# Patient Record
Sex: FEMALE | Race: WHITE | NOT HISPANIC OR LATINO | Employment: UNEMPLOYED | URBAN - METROPOLITAN AREA
[De-identification: names, ages, dates, MRNs, and addresses within clinical notes are randomized per-mention and may not be internally consistent; named-entity substitution may affect disease eponyms.]

---

## 2023-12-11 NOTE — PROGRESS NOTES
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 She does not have any evidence of any tumor recurrence.      Dysphagia secondary to dryness and to malocclusion. She seems to be adapting.     Hypothyroidism which is well controlled with medication.     I will see her in 4 months.      Chief Complaint     Follow-up status post surgery for the management of an oral cavity cancer.      History of Present Illness    This lady was seen December 2021 at the request of a local colleague. She was first noticed to have a lesion in the back aspect of her buccal region on the right side back in March 2021. This led to a few procedures where some superficial lesion was removed. She has had ongoing issues and more recently biopsies were obtained and showed invasive squamous cell carcinoma. She had surgery on December 20, 2021. She did undergo a composite resection. She was presented at our tumor board and radiation therapy was recommended. She finished radiation at the end of March 2022. She has been able to swallow by mouth. Does have some significant restriction in her diet. She is hypothyroid and on thyroid medication. She had a TSH level in May 2023 which was normal. She had a PET scan done in July 2022 that I personally reviewed. I cannot appreciate any evidence of tumor recurrence.  She had a chest x-ray in August 2023 which was negative.      Physical Exam    Examination of the oral cavity and oropharynx is negative for any worrisome mucosal lesions. There is good mobility of the tongue. She does have some trismus secondary to some scarring in the buccal area. She does have some malocclusion. She also has significant dryness. Palpation of the parotid, neck, and thyroid field fails to show any worrisome masses or adenopathies.      A flexible laryngoscopy was carried out. Under topical Xylocaine and Luis M-Synephrine the scope was introduced through the nostril. The nasopharynx,  base of tongue, hypopharynx, and larynx are visualized. The vocal cords are normally mobile. There is no pooling of secretions in the piriform sinuses. There is no evidence of any mucosal lesions. She does have some inflammation around the area.

## 2023-12-12 ENCOUNTER — OFFICE VISIT (OUTPATIENT)
Dept: OTOLARYNGOLOGY | Facility: CLINIC | Age: 78
End: 2023-12-12
Payer: MEDICARE

## 2023-12-12 VITALS — WEIGHT: 179 LBS | BODY MASS INDEX: 32.94 KG/M2 | HEIGHT: 62 IN

## 2023-12-12 DIAGNOSIS — E03.9 HYPOTHYROIDISM (ACQUIRED): ICD-10-CM

## 2023-12-12 DIAGNOSIS — R13.12 OROPHARYNGEAL DYSPHAGIA: ICD-10-CM

## 2023-12-12 DIAGNOSIS — C06.9 CANCER OF ORAL CAVITY (MULTI): Primary | ICD-10-CM

## 2023-12-12 PROCEDURE — 1036F TOBACCO NON-USER: CPT | Performed by: OTOLARYNGOLOGY

## 2023-12-12 PROCEDURE — 31575 DIAGNOSTIC LARYNGOSCOPY: CPT | Performed by: OTOLARYNGOLOGY

## 2023-12-12 PROCEDURE — 99213 OFFICE O/P EST LOW 20 MIN: CPT | Performed by: OTOLARYNGOLOGY

## 2023-12-12 PROCEDURE — 1159F MED LIST DOCD IN RCRD: CPT | Performed by: OTOLARYNGOLOGY

## 2023-12-12 RX ORDER — ACETAMINOPHEN 325 MG/1
TABLET ORAL EVERY 8 HOURS
COMMUNITY
Start: 2022-01-17

## 2023-12-12 RX ORDER — LORAZEPAM 0.5 MG/1
TABLET ORAL EVERY 8 HOURS
COMMUNITY
Start: 2023-03-22

## 2023-12-12 RX ORDER — NICOTINE POLACRILEX 2 MG
GUM BUCCAL
COMMUNITY
Start: 2022-01-17

## 2023-12-12 RX ORDER — ALBUTEROL SULFATE 90 UG/1
2 AEROSOL, METERED RESPIRATORY (INHALATION) EVERY 4 HOURS PRN
COMMUNITY
Start: 2021-04-14

## 2023-12-12 RX ORDER — LEVOTHYROXINE SODIUM 20 UG/ML
INJECTION, SOLUTION INTRAVENOUS
COMMUNITY
Start: 2022-01-17

## 2023-12-12 RX ORDER — CANDESARTAN 32 MG/1
32 TABLET ORAL
COMMUNITY
Start: 2023-10-30 | End: 2024-10-29

## 2023-12-12 RX ORDER — SERTRALINE HCL 100 MG
TABLET ORAL
COMMUNITY
Start: 2021-09-20

## 2023-12-12 RX ORDER — CHOLECALCIFEROL (VITAMIN D3) 50 MCG
TABLET ORAL
COMMUNITY

## 2023-12-12 ASSESSMENT — PATIENT HEALTH QUESTIONNAIRE - PHQ9
2. FEELING DOWN, DEPRESSED OR HOPELESS: NOT AT ALL
SUM OF ALL RESPONSES TO PHQ9 QUESTIONS 1 AND 2: 0
1. LITTLE INTEREST OR PLEASURE IN DOING THINGS: NOT AT ALL

## 2023-12-12 NOTE — LETTER
December 22, 2023     Patient: Stuart Youssef   YOB: 1945   Date of Visit: 12/12/2023       To Whom It May Concern:    Stuart Youssef was seen in my clinic on 12/12/2023 at 1:30 pm. Please excuse Stuart for her absence from work on this day to make the appointment.    If you have any questions or concerns, please don't hesitate to call.         Sincerely,         Roger Rai MD        CC: No Recipients

## 2024-04-22 NOTE — PROGRESS NOTES
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 She does not have any evidence of any tumor recurrence.      Dysphagia secondary to dryness and to malocclusion. She seems to be adapting.     Hypothyroidism which is well controlled with medication.     I will see her in 4 months.      Chief Complaint     Follow-up status post surgery for the management of an oral cavity cancer.      History of Present Illness    This lady was seen December 2021 at the request of a local colleague. She was first noticed to have a lesion in the back aspect of her buccal region on the right side back in March 2021. This led to a few procedures where some superficial lesion was removed. She has had ongoing issues and more recently biopsies were obtained and showed invasive squamous cell carcinoma. She had surgery on December 20, 2021. She did undergo a composite resection. She was presented at our tumor board and radiation therapy was recommended. She finished radiation at the end of March 2022. She has been able to swallow by mouth.  She does have some significant restriction in her diet. She is hypothyroid and on thyroid medication. She had a TSH level in April 2024 which was normal. She had a PET scan done in July 2022 that I personally reviewed. I cannot appreciate any evidence of tumor recurrence.  She had a chest x-ray in August 2023 which was negative.  She does have some occasional discomfort around the right jaw.      Physical Exam    Examination of the oral cavity and oropharynx is negative for any worrisome mucosal lesions. There is good mobility of the tongue. She does have some trismus secondary to some scarring in the buccal area. She does have some malocclusion. She also has significant dryness. Palpation of the parotid, neck, and thyroid field fails to show any worrisome masses or adenopathies.      A flexible laryngoscopy was carried out. Under topical Xylocaine and  Luis M-Synephrine the scope was introduced through the nostril. The nasopharynx, base of tongue, hypopharynx, and larynx are visualized. The vocal cords are normally mobile. There is no pooling of secretions in the piriform sinuses. There is no evidence of any mucosal lesions.

## 2024-04-23 ENCOUNTER — OFFICE VISIT (OUTPATIENT)
Dept: OTOLARYNGOLOGY | Facility: CLINIC | Age: 79
End: 2024-04-23
Payer: MEDICARE

## 2024-04-23 VITALS — BODY MASS INDEX: 33.49 KG/M2 | HEIGHT: 62 IN | WEIGHT: 182 LBS

## 2024-04-23 DIAGNOSIS — E03.9 HYPOTHYROIDISM (ACQUIRED): ICD-10-CM

## 2024-04-23 DIAGNOSIS — C06.9 CANCER OF ORAL CAVITY (MULTI): Primary | ICD-10-CM

## 2024-04-23 DIAGNOSIS — R13.12 OROPHARYNGEAL DYSPHAGIA: ICD-10-CM

## 2024-04-23 PROCEDURE — 1036F TOBACCO NON-USER: CPT | Performed by: OTOLARYNGOLOGY

## 2024-04-23 PROCEDURE — 1160F RVW MEDS BY RX/DR IN RCRD: CPT | Performed by: OTOLARYNGOLOGY

## 2024-04-23 PROCEDURE — 99213 OFFICE O/P EST LOW 20 MIN: CPT | Performed by: OTOLARYNGOLOGY

## 2024-04-23 PROCEDURE — 1157F ADVNC CARE PLAN IN RCRD: CPT | Performed by: OTOLARYNGOLOGY

## 2024-04-23 PROCEDURE — 31575 DIAGNOSTIC LARYNGOSCOPY: CPT | Performed by: OTOLARYNGOLOGY

## 2024-04-23 PROCEDURE — 1159F MED LIST DOCD IN RCRD: CPT | Performed by: OTOLARYNGOLOGY

## 2024-04-23 RX ORDER — LUTEIN 6 MG
TABLET ORAL
COMMUNITY

## 2024-07-14 ENCOUNTER — APPOINTMENT (OUTPATIENT)
Dept: RADIOLOGY | Facility: HOSPITAL | Age: 79
End: 2024-07-14
Payer: MEDICARE

## 2024-07-14 ENCOUNTER — HOSPITAL ENCOUNTER (INPATIENT)
Facility: HOSPITAL | Age: 79
LOS: 3 days | Discharge: HOME | End: 2024-07-17
Attending: STUDENT IN AN ORGANIZED HEALTH CARE EDUCATION/TRAINING PROGRAM | Admitting: SURGERY
Payer: MEDICARE

## 2024-07-14 DIAGNOSIS — R11.2 NAUSEA AND VOMITING, UNSPECIFIED VOMITING TYPE: ICD-10-CM

## 2024-07-14 DIAGNOSIS — K44.9 PARAESOPHAGEAL HERNIA: ICD-10-CM

## 2024-07-14 DIAGNOSIS — R10.9 ABDOMINAL PAIN: Primary | ICD-10-CM

## 2024-07-14 DIAGNOSIS — K31.89 GASTRIC VOLVULUS: ICD-10-CM

## 2024-07-14 LAB
ANION GAP SERPL CALC-SCNC: 13 MMOL/L (ref 10–20)
BUN SERPL-MCNC: 19 MG/DL (ref 6–23)
CALCIUM SERPL-MCNC: 9.1 MG/DL (ref 8.6–10.6)
CHLORIDE SERPL-SCNC: 107 MMOL/L (ref 98–107)
CO2 SERPL-SCNC: 30 MMOL/L (ref 21–32)
CREAT SERPL-MCNC: 0.86 MG/DL (ref 0.5–1.05)
EGFRCR SERPLBLD CKD-EPI 2021: 68 ML/MIN/1.73M*2
ERYTHROCYTE [DISTWIDTH] IN BLOOD BY AUTOMATED COUNT: 13.2 % (ref 11.5–14.5)
GLUCOSE BLD MANUAL STRIP-MCNC: 165 MG/DL (ref 74–99)
GLUCOSE SERPL-MCNC: 96 MG/DL (ref 74–99)
HCT VFR BLD AUTO: 37.7 % (ref 36–46)
HGB BLD-MCNC: 11.7 G/DL (ref 12–16)
MAGNESIUM SERPL-MCNC: 2.18 MG/DL (ref 1.6–2.4)
MCH RBC QN AUTO: 30.8 PG (ref 26–34)
MCHC RBC AUTO-ENTMCNC: 31 G/DL (ref 32–36)
MCV RBC AUTO: 99 FL (ref 80–100)
NRBC BLD-RTO: 0 /100 WBCS (ref 0–0)
PLATELET # BLD AUTO: 146 X10*3/UL (ref 150–450)
POTASSIUM SERPL-SCNC: 4.1 MMOL/L (ref 3.5–5.3)
RBC # BLD AUTO: 3.8 X10*6/UL (ref 4–5.2)
SODIUM SERPL-SCNC: 146 MMOL/L (ref 136–145)
WBC # BLD AUTO: 9.4 X10*3/UL (ref 4.4–11.3)

## 2024-07-14 PROCEDURE — 83735 ASSAY OF MAGNESIUM: CPT | Performed by: STUDENT IN AN ORGANIZED HEALTH CARE EDUCATION/TRAINING PROGRAM

## 2024-07-14 PROCEDURE — 80048 BASIC METABOLIC PNL TOTAL CA: CPT | Performed by: STUDENT IN AN ORGANIZED HEALTH CARE EDUCATION/TRAINING PROGRAM

## 2024-07-14 PROCEDURE — 85027 COMPLETE CBC AUTOMATED: CPT | Performed by: STUDENT IN AN ORGANIZED HEALTH CARE EDUCATION/TRAINING PROGRAM

## 2024-07-14 PROCEDURE — 99285 EMERGENCY DEPT VISIT HI MDM: CPT | Performed by: STUDENT IN AN ORGANIZED HEALTH CARE EDUCATION/TRAINING PROGRAM

## 2024-07-14 PROCEDURE — 2500000004 HC RX 250 GENERAL PHARMACY W/ HCPCS (ALT 636 FOR OP/ED): Performed by: STUDENT IN AN ORGANIZED HEALTH CARE EDUCATION/TRAINING PROGRAM

## 2024-07-14 PROCEDURE — 82947 ASSAY GLUCOSE BLOOD QUANT: CPT

## 2024-07-14 PROCEDURE — 99285 EMERGENCY DEPT VISIT HI MDM: CPT

## 2024-07-14 PROCEDURE — 36415 COLL VENOUS BLD VENIPUNCTURE: CPT | Performed by: STUDENT IN AN ORGANIZED HEALTH CARE EDUCATION/TRAINING PROGRAM

## 2024-07-14 PROCEDURE — 74018 RADEX ABDOMEN 1 VIEW: CPT

## 2024-07-14 PROCEDURE — 1100000001 HC PRIVATE ROOM DAILY

## 2024-07-14 RX ORDER — SODIUM CHLORIDE, SODIUM LACTATE, POTASSIUM CHLORIDE, CALCIUM CHLORIDE 600; 310; 30; 20 MG/100ML; MG/100ML; MG/100ML; MG/100ML
100 INJECTION, SOLUTION INTRAVENOUS CONTINUOUS
Status: DISCONTINUED | OUTPATIENT
Start: 2024-07-14 | End: 2024-07-15

## 2024-07-14 RX ORDER — LEVOTHYROXINE SODIUM ANHYDROUS 100 UG/5ML
12.5 INJECTION, POWDER, LYOPHILIZED, FOR SOLUTION INTRAVENOUS
Status: DISCONTINUED | OUTPATIENT
Start: 2024-07-14 | End: 2024-07-16

## 2024-07-14 RX ORDER — ENOXAPARIN SODIUM 100 MG/ML
40 INJECTION SUBCUTANEOUS EVERY 24 HOURS
Status: DISCONTINUED | OUTPATIENT
Start: 2024-07-14 | End: 2024-07-16

## 2024-07-14 RX ORDER — ALBUTEROL SULFATE 90 UG/1
2 AEROSOL, METERED RESPIRATORY (INHALATION) EVERY 4 HOURS PRN
Status: DISCONTINUED | OUTPATIENT
Start: 2024-07-14 | End: 2024-07-16

## 2024-07-14 SDOH — HEALTH STABILITY: PHYSICAL HEALTH: ON AVERAGE, HOW MANY DAYS PER WEEK DO YOU ENGAGE IN MODERATE TO STRENUOUS EXERCISE (LIKE A BRISK WALK)?: 0 DAYS

## 2024-07-14 SDOH — SOCIAL STABILITY: SOCIAL INSECURITY: HAVE YOU HAD ANY THOUGHTS OF HARMING ANYONE ELSE?: NO

## 2024-07-14 SDOH — SOCIAL STABILITY: SOCIAL INSECURITY: WERE YOU ABLE TO COMPLETE ALL THE BEHAVIORAL HEALTH SCREENINGS?: YES

## 2024-07-14 SDOH — SOCIAL STABILITY: SOCIAL INSECURITY: ARE YOU OR HAVE YOU BEEN THREATENED OR ABUSED PHYSICALLY, EMOTIONALLY, OR SEXUALLY BY ANYONE?: NO

## 2024-07-14 SDOH — HEALTH STABILITY: PHYSICAL HEALTH: ON AVERAGE, HOW MANY MINUTES DO YOU ENGAGE IN EXERCISE AT THIS LEVEL?: 0 MIN

## 2024-07-14 SDOH — SOCIAL STABILITY: SOCIAL INSECURITY: ABUSE: ADULT

## 2024-07-14 SDOH — SOCIAL STABILITY: SOCIAL INSECURITY: DO YOU FEEL UNSAFE GOING BACK TO THE PLACE WHERE YOU ARE LIVING?: NO

## 2024-07-14 SDOH — SOCIAL STABILITY: SOCIAL INSECURITY: HAS ANYONE EVER THREATENED TO HURT YOUR FAMILY OR YOUR PETS?: NO

## 2024-07-14 SDOH — SOCIAL STABILITY: SOCIAL INSECURITY: DOES ANYONE TRY TO KEEP YOU FROM HAVING/CONTACTING OTHER FRIENDS OR DOING THINGS OUTSIDE YOUR HOME?: NO

## 2024-07-14 SDOH — SOCIAL STABILITY: SOCIAL INSECURITY: DO YOU FEEL ANYONE HAS EXPLOITED OR TAKEN ADVANTAGE OF YOU FINANCIALLY OR OF YOUR PERSONAL PROPERTY?: NO

## 2024-07-14 SDOH — SOCIAL STABILITY: SOCIAL INSECURITY: HAVE YOU HAD THOUGHTS OF HARMING ANYONE ELSE?: NO

## 2024-07-14 SDOH — SOCIAL STABILITY: SOCIAL INSECURITY: ARE THERE ANY APPARENT SIGNS OF INJURIES/BEHAVIORS THAT COULD BE RELATED TO ABUSE/NEGLECT?: NO

## 2024-07-14 ASSESSMENT — COGNITIVE AND FUNCTIONAL STATUS - GENERAL
DRESSING REGULAR UPPER BODY CLOTHING: A LITTLE
DAILY ACTIVITIY SCORE: 21
MOBILITY SCORE: 20
MOVING TO AND FROM BED TO CHAIR: A LITTLE
MOVING TO AND FROM BED TO CHAIR: A LITTLE
HELP NEEDED FOR BATHING: A LITTLE
DRESSING REGULAR LOWER BODY CLOTHING: A LITTLE
MOBILITY SCORE: 20
STANDING UP FROM CHAIR USING ARMS: A LITTLE
CLIMB 3 TO 5 STEPS WITH RAILING: A LOT
STANDING UP FROM CHAIR USING ARMS: A LITTLE
DAILY ACTIVITIY SCORE: 21
MOVING TO AND FROM BED TO CHAIR: A LITTLE
CLIMB 3 TO 5 STEPS WITH RAILING: A LITTLE
HELP NEEDED FOR BATHING: A LITTLE
HELP NEEDED FOR BATHING: A LITTLE
CLIMB 3 TO 5 STEPS WITH RAILING: A LITTLE
WALKING IN HOSPITAL ROOM: A LITTLE
WALKING IN HOSPITAL ROOM: A LITTLE
DRESSING REGULAR UPPER BODY CLOTHING: A LITTLE
PATIENT BASELINE BEDBOUND: NO
DRESSING REGULAR UPPER BODY CLOTHING: A LITTLE
DRESSING REGULAR LOWER BODY CLOTHING: A LITTLE
MOBILITY SCORE: 19
DAILY ACTIVITIY SCORE: 22
WALKING IN HOSPITAL ROOM: A LITTLE
STANDING UP FROM CHAIR USING ARMS: A LITTLE

## 2024-07-14 ASSESSMENT — LIFESTYLE VARIABLES
HAVE PEOPLE ANNOYED YOU BY CRITICIZING YOUR DRINKING: NO
EVER FELT BAD OR GUILTY ABOUT YOUR DRINKING: NO
PRESCIPTION_ABUSE_PAST_12_MONTHS: NO
EVER HAD A DRINK FIRST THING IN THE MORNING TO STEADY YOUR NERVES TO GET RID OF A HANGOVER: NO
HAVE YOU EVER FELT YOU SHOULD CUT DOWN ON YOUR DRINKING: NO
HOW MANY STANDARD DRINKS CONTAINING ALCOHOL DO YOU HAVE ON A TYPICAL DAY: PATIENT DOES NOT DRINK
HOW OFTEN DO YOU HAVE A DRINK CONTAINING ALCOHOL: NEVER
TOTAL SCORE: 0
AUDIT-C TOTAL SCORE: 0
SUBSTANCE_ABUSE_PAST_12_MONTHS: NO
SKIP TO QUESTIONS 9-10: 1
HOW OFTEN DO YOU HAVE 6 OR MORE DRINKS ON ONE OCCASION: NEVER
AUDIT-C TOTAL SCORE: 0

## 2024-07-14 ASSESSMENT — ENCOUNTER SYMPTOMS
HEADACHES: 0
CONSTIPATION: 0
ABDOMINAL PAIN: 1
APPETITE CHANGE: 0
DIZZINESS: 0
CHEST TIGHTNESS: 0
NAUSEA: 0
FEVER: 0
ABDOMINAL DISTENTION: 0
DIFFICULTY URINATING: 0
DIARRHEA: 0
SHORTNESS OF BREATH: 0
COLOR CHANGE: 0
CHILLS: 0

## 2024-07-14 ASSESSMENT — PAIN SCALES - GENERAL
PAINLEVEL_OUTOF10: 4
PAINLEVEL_OUTOF10: 4
PAINLEVEL_OUTOF10: 0 - NO PAIN

## 2024-07-14 ASSESSMENT — ACTIVITIES OF DAILY LIVING (ADL)
TOILETING: INDEPENDENT
FEEDING YOURSELF: INDEPENDENT
GROOMING: INDEPENDENT
BATHING: INDEPENDENT
DRESSING YOURSELF: INDEPENDENT
PATIENT'S MEMORY ADEQUATE TO SAFELY COMPLETE DAILY ACTIVITIES?: YES
ADEQUATE_TO_COMPLETE_ADL: YES
LACK_OF_TRANSPORTATION: NO
JUDGMENT_ADEQUATE_SAFELY_COMPLETE_DAILY_ACTIVITIES: YES
WALKS IN HOME: INDEPENDENT
LACK_OF_TRANSPORTATION: NO
HEARING - LEFT EAR: FUNCTIONAL
HEARING - RIGHT EAR: FUNCTIONAL

## 2024-07-14 ASSESSMENT — COLUMBIA-SUICIDE SEVERITY RATING SCALE - C-SSRS
1. IN THE PAST MONTH, HAVE YOU WISHED YOU WERE DEAD OR WISHED YOU COULD GO TO SLEEP AND NOT WAKE UP?: NO
2. HAVE YOU ACTUALLY HAD ANY THOUGHTS OF KILLING YOURSELF?: NO
6. HAVE YOU EVER DONE ANYTHING, STARTED TO DO ANYTHING, OR PREPARED TO DO ANYTHING TO END YOUR LIFE?: NO

## 2024-07-14 ASSESSMENT — PATIENT HEALTH QUESTIONNAIRE - PHQ9
2. FEELING DOWN, DEPRESSED OR HOPELESS: NOT AT ALL
1. LITTLE INTEREST OR PLEASURE IN DOING THINGS: NOT AT ALL
SUM OF ALL RESPONSES TO PHQ9 QUESTIONS 1 & 2: 0

## 2024-07-14 ASSESSMENT — PAIN - FUNCTIONAL ASSESSMENT
PAIN_FUNCTIONAL_ASSESSMENT: 0-10
PAIN_FUNCTIONAL_ASSESSMENT: 0-10

## 2024-07-14 ASSESSMENT — PAIN DESCRIPTION - DESCRIPTORS: DESCRIPTORS: CRAMPING;DISCOMFORT

## 2024-07-14 ASSESSMENT — PAIN DESCRIPTION - LOCATION: LOCATION: ABDOMEN

## 2024-07-14 NOTE — ED PROVIDER NOTES
CC: Vomiting     HPI:   Patient is a 79-year-old female with past medical history of hypothyroidism, hypertension, hyperlipidemia, oral cancer status post resection and radiation in 2022 presenting as a transfer from [place] due to concerns of a gastric volvulus and unable to pass NG tube.  Patient reports that since yesterday evening after her son and her got 1 days, she started having recurrent episodes of emesis however her son had diarrhea so she thought it was food poisoning.  Her son started to feel better but she has continued to feel worse and went to the ED.  She was diagnosed with a gastric volvulus.  She does not have any abdominal pain, chest pain or shortness of breath, denies any history of MI and is not on any blood thinners.  She did not have any falls does not have any focal neurologic deficits at this time.  Patient is afebrile and mildly hypertensive on initial vitals    Limitations to History: none  Additional History Obtained from: EMS    PMHx/PSHx:  Per HPI.   - has a past medical history of Disorder of thyroid, unspecified, Personal history of malignant neoplasm, unspecified, Personal history of other diseases of the circulatory system, Personal history of other diseases of the digestive system, and Personal history of other diseases of the respiratory system.  - has a past surgical history that includes Other surgical history (11/30/2021); Other surgical history (11/30/2021); and Other surgical history (11/30/2021).    Social History:  - Tobacco:  reports that she has never smoked. She has never used smokeless tobacco.   - Alcohol:  has no history on file for alcohol use.   - Drugs:  has no history on file for drug use.     Medications: Reviewed in EMR.     Allergies:  Barium sulfate, Ioversol, Sulfa (sulfonamide antibiotics), and Sulfanilamide    ???????????????????????????????????????????????????????????????  Triage Vitals:  T 35.9 °C (96.7 °F)  HR 78  BP (!) 191/91  RR 16  O2 96 %  None (Room air)    Physical Exam  Vitals and nursing note reviewed.   Constitutional:       General: She is not in acute distress.     Appearance: She is well-developed.   HENT:      Head: Normocephalic and atraumatic.      Nose: Nose normal.      Comments: NGT in place     Mouth/Throat:      Mouth: Mucous membranes are dry.      Pharynx: Oropharynx is clear.   Eyes:      Conjunctiva/sclera: Conjunctivae normal.   Cardiovascular:      Rate and Rhythm: Normal rate and regular rhythm.      Heart sounds: No murmur heard.  Pulmonary:      Effort: Pulmonary effort is normal. No respiratory distress.      Breath sounds: Normal breath sounds. No wheezing, rhonchi or rales.   Abdominal:      General: There is distension.      Palpations: Abdomen is soft.      Tenderness: There is no abdominal tenderness. There is no guarding or rebound.   Musculoskeletal:         General: No swelling or tenderness.      Cervical back: Neck supple.   Skin:     General: Skin is warm and dry.      Capillary Refill: Capillary refill takes less than 2 seconds.   Neurological:      Mental Status: She is alert and oriented to person, place, and time.      Sensory: No sensory deficit.      Motor: No weakness.      Gait: Gait normal.   Psychiatric:         Mood and Affect: Mood normal.       ?????????????????????????????????????????????    ED Course  Diagnoses as of 07/14/24 0216   Abdominal pain       Medical Decision Making:  Patient is a 79-year-old female with past medical history of hypothyroidism, hypertension, hyperlipidemia and remote history of oral cancer presenting due to gastric volvulus.  Surgery was consulted and were able to pass an NG tube that is now low intermittent wall suction.  They did not request any further imaging, lab work at this time.  They did recommend admission to their service for management of the tube and possible endoscopy on Monday.  Patient was comfortable with this plan and admitted in hemodynamically stable  condition.  Patient care was overseen by attending physician agrees with the plan and disposition.    External records reviewed: recent inpatient, clinic, and prior ED notes  Diagnostic imaging independently reviewed/interpreted by me (as reflected in MDM) includes: none  Social Determinants Affecting Care: None identified  Discussion of management with other providers: attending, surgery  Prescription Drug Consideration: per inpatient team  Escalation of Care: admission    Impression:   Gastric volvulus  Nausea and Vomiting    Disposition: Admitted      Procedures ? SmartLinks last updated 7/14/2024 1:31 AM     Elena Pepper  PGY-2 Emergency Medicine  [facility]     Elena Pepper MD  Resident  07/14/24 0212

## 2024-07-14 NOTE — CARE PLAN
Problem: Pain  Goal: Takes deep breaths with improved pain control throughout the shift  Outcome: Progressing  Goal: Turns in bed with improved pain control throughout the shift  Outcome: Progressing  Goal: Walks with improved pain control throughout the shift  Outcome: Progressing  Goal: Performs ADL's with improved pain control throughout shift  Outcome: Progressing  Goal: Participates in PT with improved pain control throughout the shift  Outcome: Progressing  Goal: Free from opioid side effects throughout the shift  Outcome: Progressing  Goal: Free from acute confusion related to pain meds throughout the shift  Outcome: Progressing   The patient's goals for the shift include patient will be HDS    The clinical goals for the shift include get rid of this tube    Over the shift, the patient did make progress toward the following goals.

## 2024-07-14 NOTE — PROGRESS NOTES
07/14/24 1631   Discharge Planning   Living Arrangements Alone   Support Systems Children   Assistance Needed none   Type of Residence Private residence   Number of Stairs to Enter Residence 2   Number of Stairs Within Residence 14   Do you have animals or pets at home? No   Who is requesting discharge planning? Other (Comment)  (TCC assessment)   Home or Post Acute Services None   Expected Discharge Disposition Home   Does the patient need discharge transport arranged? No   Financial Resource Strain   How hard is it for you to pay for the very basics like food, housing, medical care, and heating? Not very   Housing Stability   In the last 12 months, was there a time when you were not able to pay the mortgage or rent on time? N   In the past 12 months, how many times have you moved where you were living? 1   At any time in the past 12 months, were you homeless or living in a shelter (including now)? N   Transportation Needs   In the past 12 months, has lack of transportation kept you from medical appointments or from getting medications? no   In the past 12 months, has lack of transportation kept you from meetings, work, or from getting things needed for daily living? No     Transitional Care Coordinator Note: Met with patient to discuss discharge planning s/p admission.  Patient lives home alone. Support person son (Kali)  Independent in ADL's. Requires no assist devices for ambulation( cane).  Patient denies active home care or home care needs.  Demographics and contact information confirmed.  Will continue to monitor patient for all home going needs.  Stacy Hale RN TCC via Epic.    Falls-none   Equipment- cane   HC- none   SW- none   DM-none   Dialysis- none   O2/CPAP- none   [facility]   Pharm- [place] [place]   Transport at discharge- Family

## 2024-07-14 NOTE — CARE PLAN
Problem: Pain  Goal: Takes deep breaths with improved pain control throughout the shift  Outcome: Progressing  Goal: Turns in bed with improved pain control throughout the shift  Outcome: Progressing  Goal: Walks with improved pain control throughout the shift  Outcome: Progressing  Goal: Performs ADL's with improved pain control throughout shift  Outcome: Progressing  Goal: Participates in PT with improved pain control throughout the shift  Outcome: Progressing  Goal: Free from opioid side effects throughout the shift  Outcome: Progressing  Goal: Free from acute confusion related to pain meds throughout the shift  Outcome: Progressing   The patient's goals for the shift include patient will be HDS    The clinical goals for the shift include Patient will have decreased abdominal discomfort

## 2024-07-14 NOTE — ED TRIAGE NOTES
Patient arrives to ED via EMS as a transfer patient from [facility] with complaints of 11 x episodes of vomiting and mid-abdominal pain. Patient went out to eat with her son at a fast food restaurant on Thursday and has been vomiting since that time and has not been able to eat or drink anything for the past 2 days. Of note, the son also experienced severe diarrhea after eating at the restaurant. Patient denies any bloody emesis and denies any diarrhea. Patient has known hiatal hernia and gastric volvulus. [facility] staff also attempted to insert NG tube 3 times and were unsuccessful. Patient also has an extensive list of allergies

## 2024-07-14 NOTE — H&P
[facility]  [facility] - HISTORY AND PHYSICAL / CONSULT    Patient Name: Stuart Youssef  MRN: [id number]  Admit Date:   : 1945  AGE: 79 y.o.   GENDER: female  ==============================================================================  TODAY'S ASSESSMENT AND PLAN OF CARE:  Admit patient to [facility] surgery  Neuro:  Treat pain as needed with IV medication; currently no pain  CV:  - vitals q4h  -holding home candesartan  Pulm:  - maintain SaO2 > 92%, currently on 2LNC  GI:  - NPO  - NGT placed to [facility] for confirmation  - plan for EGD on Monday  :  - IVF  - no indication for villalba  - replete lytes PRN  ID:  - no indication for abx  Endo:  - levothyroxine IV ordered; home dose 20mg daily  - no hx DM, will monitor need for IS  DVT ppx:  - lvx, SCDs  Dispo:  - RNF    Patient discussed with attending on-call Dr. Milton Clark MD  [place] [phone]          ==============================================================================  CHIEF COMPLAINT/REASON FOR CONSULT:  78yo F with history of hypothyroidism, HTN, HLD, oral cancer (s/p resection and radiation in ) presenting as a transfer from [facility] with concern for gastric volvulus.   She reports having pain for 1 day in her upper abdomen with 10 episodes of emesis since that time. It has been dark brown in color. She has not been able to tolerate PO. She feels very hungry and thirsty. She denies fevers, chills. She has some mild shortness of breath relieved with nasal cannula. She denies chest pain. Her last BM was yesterday.   She has a known history of hiatal hernia which she says she has had for many years. She has never had any symptoms of pain or heartburn from it and has never tried to have surgery to correct it.     At the OSH, CT obtained showing gastric volvulus without gastric wall thickening or pneumatosis. NG placement was attempted several times at the hospital.     PAST MEDICAL HISTORY:    PMH: hypothyroidism, HTN, HLD, oral cancer (s/p resection and radiation in 2022)  PSH: appendectomy, hysterectomy, resection oral cancer  FH: cancer (brother)  SOCIAL HISTORY: never smoker, no alcohol use, no illicit drug use  MEDICATIONS:   Prior to Admission medications    Medication Sig Start Date End Date Taking? Authorizing Provider   acetaminophen (Tylenol) 325 mg tablet Take by mouth every 8 hours. 1/17/22   Historical Provider, MD   albuterol 90 mcg/actuation inhaler Inhale 2 puffs every 4 hours if needed. 4/14/21   Historical Provider, MD   Ativan 0.5 mg tablet every 8 hours. 3/22/23   Historical Provider, MD   biotin 1 mg capsule 1000 microgram(s)  once a day 1/17/22   Historical Provider, MD   candesartan (Atacand) 32 mg tablet Take 1 tablet (32 mg) by mouth once daily. 10/30/23 10/29/24  Historical Provider, MD   cholecalciferol (Vitamin D-3) 50 MCG (2000 UT) tablet 1 (one) time each day at the same time.    Historical Provider, MD   levothyroxine (Synthroid) 20 mcg/mL solution Take by mouth. 1/17/22   Historical Provider, MD   lutein 20 mg tablet Take by mouth.    Historical Provider, MD   Zoloft 100 mg tablet 1 (one) time each day at the same time. 9/20/21   Historical Provider, MD   ALLERGIES:   Allergies   Allergen Reactions    Barium Sulfate Unknown     Other Reaction(s): Unknown    Ioversol Unknown     Other Reaction(s): Discomfort    Sulfa (Sulfonamide Antibiotics) Unknown    Sulfanilamide Unknown     Other Reaction(s): Unknown       REVIEW OF SYSTEMS:  Review of Systems   Constitutional:  Negative for appetite change, chills and fever.   Respiratory:  Negative for chest tightness and shortness of breath.    Cardiovascular:  Positive for leg swelling. Negative for chest pain.   Gastrointestinal:  Positive for abdominal pain. Negative for abdominal distention, constipation, diarrhea and nausea.   Genitourinary:  Negative for difficulty urinating.   Skin:  Negative for color change.    Neurological:  Negative for dizziness and headaches.     PHYSICAL EXAM:  Physical Exam  Constitutional:       General: She is not in acute distress.     Appearance: Normal appearance. She is not ill-appearing, toxic-appearing or diaphoretic.   HENT:      Head: Normocephalic and atraumatic.      Nose: Nose normal.   Eyes:      Pupils: Pupils are equal, round, and reactive to light.   Cardiovascular:      Rate and Rhythm: Normal rate and regular rhythm.   Pulmonary:      Effort: Pulmonary effort is normal.      Breath sounds: Normal breath sounds.   Abdominal:      General: There is no distension.      Palpations: Abdomen is soft.      Tenderness: There is no abdominal tenderness. There is no guarding.   Musculoskeletal:         General: Swelling present. Normal range of motion.   Skin:     General: Skin is warm and dry.   Neurological:      General: No focal deficit present.      Mental Status: She is alert and oriented to person, place, and time.   Psychiatric:         Mood and Affect: Mood normal.       IMAGING SUMMARY:   CT Abdomen Pelvis w/ IV contrast 7/13  Findings: large hiatal hernia with gastric body, antrum, and pylorus in the loewr mediastinum. Thickening of esophageal wall is noted. The gastroesophageal junction appears to be below the ostium of the hernia. This  is suggestive of a gastric volvulus with the gastric lumen being relatively fluid distended.    LABS:  WBC 9.5, Hgb 14.6, Plt 201  Na 144, K 3.8, BUN 17, Cr 1.06    I have reviewed all laboratory and imaging results ordered/pertinent for this encounter.

## 2024-07-15 ENCOUNTER — ANESTHESIA EVENT (OUTPATIENT)
Dept: OPERATING ROOM | Facility: HOSPITAL | Age: 79
End: 2024-07-15
Payer: MEDICARE

## 2024-07-15 ENCOUNTER — ANESTHESIA (OUTPATIENT)
Dept: OPERATING ROOM | Facility: HOSPITAL | Age: 79
End: 2024-07-15
Payer: MEDICARE

## 2024-07-15 LAB
ABO GROUP (TYPE) IN BLOOD: NORMAL
ALBUMIN SERPL BCP-MCNC: 3.4 G/DL (ref 3.4–5)
ALP SERPL-CCNC: 40 U/L (ref 33–136)
ALT SERPL W P-5'-P-CCNC: 17 U/L (ref 7–45)
ANION GAP SERPL CALC-SCNC: 10 MMOL/L (ref 10–20)
ANTIBODY SCREEN: NORMAL
APTT PPP: 31 SECONDS (ref 27–38)
AST SERPL W P-5'-P-CCNC: 30 U/L (ref 9–39)
BILIRUB SERPL-MCNC: 0.5 MG/DL (ref 0–1.2)
BUN SERPL-MCNC: 19 MG/DL (ref 6–23)
CALCIUM SERPL-MCNC: 9 MG/DL (ref 8.6–10.6)
CHLORIDE SERPL-SCNC: 105 MMOL/L (ref 98–107)
CO2 SERPL-SCNC: 35 MMOL/L (ref 21–32)
CREAT SERPL-MCNC: 0.8 MG/DL (ref 0.5–1.05)
EGFRCR SERPLBLD CKD-EPI 2021: 74 ML/MIN/1.73M*2
ERYTHROCYTE [DISTWIDTH] IN BLOOD BY AUTOMATED COUNT: 13 % (ref 11.5–14.5)
GLUCOSE BLD MANUAL STRIP-MCNC: 101 MG/DL (ref 74–99)
GLUCOSE BLD MANUAL STRIP-MCNC: 112 MG/DL (ref 74–99)
GLUCOSE BLD MANUAL STRIP-MCNC: 118 MG/DL (ref 74–99)
GLUCOSE BLD MANUAL STRIP-MCNC: 118 MG/DL (ref 74–99)
GLUCOSE BLD MANUAL STRIP-MCNC: 170 MG/DL (ref 74–99)
GLUCOSE BLD MANUAL STRIP-MCNC: 67 MG/DL (ref 74–99)
GLUCOSE BLD MANUAL STRIP-MCNC: 90 MG/DL (ref 74–99)
GLUCOSE BLD MANUAL STRIP-MCNC: 99 MG/DL (ref 74–99)
GLUCOSE SERPL-MCNC: 89 MG/DL (ref 74–99)
HCT VFR BLD AUTO: 37.6 % (ref 36–46)
HGB BLD-MCNC: 11.9 G/DL (ref 12–16)
INR PPP: 1.2 (ref 0.9–1.1)
MAGNESIUM SERPL-MCNC: 2.1 MG/DL (ref 1.6–2.4)
MCH RBC QN AUTO: 29.9 PG (ref 26–34)
MCHC RBC AUTO-ENTMCNC: 31.6 G/DL (ref 32–36)
MCV RBC AUTO: 95 FL (ref 80–100)
NRBC BLD-RTO: 0 /100 WBCS (ref 0–0)
PLATELET # BLD AUTO: 149 X10*3/UL (ref 150–450)
POTASSIUM SERPL-SCNC: 3.7 MMOL/L (ref 3.5–5.3)
PROT SERPL-MCNC: 6.1 G/DL (ref 6.4–8.2)
PROTHROMBIN TIME: 13.6 SECONDS (ref 9.8–12.8)
RBC # BLD AUTO: 3.98 X10*6/UL (ref 4–5.2)
RH FACTOR (ANTIGEN D): NORMAL
SODIUM SERPL-SCNC: 146 MMOL/L (ref 136–145)
WBC # BLD AUTO: 8.6 X10*3/UL (ref 4.4–11.3)

## 2024-07-15 PROCEDURE — 2500000004 HC RX 250 GENERAL PHARMACY W/ HCPCS (ALT 636 FOR OP/ED)

## 2024-07-15 PROCEDURE — 7100000001 HC RECOVERY ROOM TIME - INITIAL BASE CHARGE: Performed by: STUDENT IN AN ORGANIZED HEALTH CARE EDUCATION/TRAINING PROGRAM

## 2024-07-15 PROCEDURE — 2500000004 HC RX 250 GENERAL PHARMACY W/ HCPCS (ALT 636 FOR OP/ED): Performed by: NURSE PRACTITIONER

## 2024-07-15 PROCEDURE — 2500000005 HC RX 250 GENERAL PHARMACY W/O HCPCS

## 2024-07-15 PROCEDURE — 0DJ08ZZ INSPECTION OF UPPER INTESTINAL TRACT, VIA NATURAL OR ARTIFICIAL OPENING ENDOSCOPIC: ICD-10-PCS | Performed by: STUDENT IN AN ORGANIZED HEALTH CARE EDUCATION/TRAINING PROGRAM

## 2024-07-15 PROCEDURE — 1100000001 HC PRIVATE ROOM DAILY

## 2024-07-15 PROCEDURE — 2500000001 HC RX 250 WO HCPCS SELF ADMINISTERED DRUGS (ALT 637 FOR MEDICARE OP): Performed by: STUDENT IN AN ORGANIZED HEALTH CARE EDUCATION/TRAINING PROGRAM

## 2024-07-15 PROCEDURE — 85610 PROTHROMBIN TIME: CPT | Performed by: STUDENT IN AN ORGANIZED HEALTH CARE EDUCATION/TRAINING PROGRAM

## 2024-07-15 PROCEDURE — P9045 ALBUMIN (HUMAN), 5%, 250 ML: HCPCS | Mod: JZ

## 2024-07-15 PROCEDURE — 85027 COMPLETE CBC AUTOMATED: CPT | Performed by: STUDENT IN AN ORGANIZED HEALTH CARE EDUCATION/TRAINING PROGRAM

## 2024-07-15 PROCEDURE — 3600000009 HC OR TIME - EACH INCREMENTAL 1 MINUTE - PROCEDURE LEVEL FOUR: Performed by: STUDENT IN AN ORGANIZED HEALTH CARE EDUCATION/TRAINING PROGRAM

## 2024-07-15 PROCEDURE — 0BQT4ZZ REPAIR DIAPHRAGM, PERCUTANEOUS ENDOSCOPIC APPROACH: ICD-10-PCS | Performed by: STUDENT IN AN ORGANIZED HEALTH CARE EDUCATION/TRAINING PROGRAM

## 2024-07-15 PROCEDURE — 2500000005 HC RX 250 GENERAL PHARMACY W/O HCPCS: Performed by: NURSE PRACTITIONER

## 2024-07-15 PROCEDURE — 43235 EGD DIAGNOSTIC BRUSH WASH: CPT | Performed by: STUDENT IN AN ORGANIZED HEALTH CARE EDUCATION/TRAINING PROGRAM

## 2024-07-15 PROCEDURE — 86901 BLOOD TYPING SEROLOGIC RH(D): CPT | Performed by: STUDENT IN AN ORGANIZED HEALTH CARE EDUCATION/TRAINING PROGRAM

## 2024-07-15 PROCEDURE — 43281 LAP PARAESOPHAG HERN REPAIR: CPT | Performed by: STUDENT IN AN ORGANIZED HEALTH CARE EDUCATION/TRAINING PROGRAM

## 2024-07-15 PROCEDURE — 3700000001 HC GENERAL ANESTHESIA TIME - INITIAL BASE CHARGE: Performed by: STUDENT IN AN ORGANIZED HEALTH CARE EDUCATION/TRAINING PROGRAM

## 2024-07-15 PROCEDURE — 2500000005 HC RX 250 GENERAL PHARMACY W/O HCPCS: Performed by: STUDENT IN AN ORGANIZED HEALTH CARE EDUCATION/TRAINING PROGRAM

## 2024-07-15 PROCEDURE — 3600000004 HC OR TIME - INITIAL BASE CHARGE - PROCEDURE LEVEL FOUR: Performed by: STUDENT IN AN ORGANIZED HEALTH CARE EDUCATION/TRAINING PROGRAM

## 2024-07-15 PROCEDURE — 7100000002 HC RECOVERY ROOM TIME - EACH INCREMENTAL 1 MINUTE: Performed by: STUDENT IN AN ORGANIZED HEALTH CARE EDUCATION/TRAINING PROGRAM

## 2024-07-15 PROCEDURE — 3700000002 HC GENERAL ANESTHESIA TIME - EACH INCREMENTAL 1 MINUTE: Performed by: STUDENT IN AN ORGANIZED HEALTH CARE EDUCATION/TRAINING PROGRAM

## 2024-07-15 PROCEDURE — 2500000004 HC RX 250 GENERAL PHARMACY W/ HCPCS (ALT 636 FOR OP/ED): Performed by: STUDENT IN AN ORGANIZED HEALTH CARE EDUCATION/TRAINING PROGRAM

## 2024-07-15 PROCEDURE — 80053 COMPREHEN METABOLIC PANEL: CPT | Performed by: STUDENT IN AN ORGANIZED HEALTH CARE EDUCATION/TRAINING PROGRAM

## 2024-07-15 PROCEDURE — 82947 ASSAY GLUCOSE BLOOD QUANT: CPT

## 2024-07-15 PROCEDURE — 83735 ASSAY OF MAGNESIUM: CPT | Performed by: STUDENT IN AN ORGANIZED HEALTH CARE EDUCATION/TRAINING PROGRAM

## 2024-07-15 PROCEDURE — 36415 COLL VENOUS BLD VENIPUNCTURE: CPT | Performed by: STUDENT IN AN ORGANIZED HEALTH CARE EDUCATION/TRAINING PROGRAM

## 2024-07-15 PROCEDURE — 2720000007 HC OR 272 NO HCPCS: Performed by: STUDENT IN AN ORGANIZED HEALTH CARE EDUCATION/TRAINING PROGRAM

## 2024-07-15 RX ORDER — FENTANYL CITRATE 50 UG/ML
INJECTION, SOLUTION INTRAMUSCULAR; INTRAVENOUS AS NEEDED
Status: DISCONTINUED | OUTPATIENT
Start: 2024-07-15 | End: 2024-07-15

## 2024-07-15 RX ORDER — POTASSIUM CHLORIDE 14.9 MG/ML
20 INJECTION INTRAVENOUS ONCE
Status: COMPLETED | OUTPATIENT
Start: 2024-07-15 | End: 2024-07-15

## 2024-07-15 RX ORDER — DEXTROSE 50 % IN WATER (D50W) INTRAVENOUS SYRINGE
25
Status: DISCONTINUED | OUTPATIENT
Start: 2024-07-15 | End: 2024-07-16

## 2024-07-15 RX ORDER — CEFAZOLIN 1 G/1
INJECTION, POWDER, FOR SOLUTION INTRAVENOUS AS NEEDED
Status: DISCONTINUED | OUTPATIENT
Start: 2024-07-15 | End: 2024-07-15

## 2024-07-15 RX ORDER — HYDROMORPHONE HYDROCHLORIDE 1 MG/ML
0.5 INJECTION, SOLUTION INTRAMUSCULAR; INTRAVENOUS; SUBCUTANEOUS EVERY 5 MIN PRN
Status: DISCONTINUED | OUTPATIENT
Start: 2024-07-15 | End: 2024-07-15 | Stop reason: HOSPADM

## 2024-07-15 RX ORDER — OXYCODONE HCL 5 MG/5 ML
5 SOLUTION, ORAL ORAL EVERY 4 HOURS PRN
Status: CANCELLED | OUTPATIENT
Start: 2024-07-15

## 2024-07-15 RX ORDER — ACETAMINOPHEN 10 MG/ML
1000 INJECTION, SOLUTION INTRAVENOUS EVERY 6 HOURS SCHEDULED
Status: COMPLETED | OUTPATIENT
Start: 2024-07-15 | End: 2024-07-16

## 2024-07-15 RX ORDER — WATER 1 ML/ML
IRRIGANT IRRIGATION AS NEEDED
Status: DISCONTINUED | OUTPATIENT
Start: 2024-07-15 | End: 2024-07-15 | Stop reason: HOSPADM

## 2024-07-15 RX ORDER — HYDROMORPHONE HYDROCHLORIDE 1 MG/ML
INJECTION, SOLUTION INTRAMUSCULAR; INTRAVENOUS; SUBCUTANEOUS AS NEEDED
Status: DISCONTINUED | OUTPATIENT
Start: 2024-07-15 | End: 2024-07-15

## 2024-07-15 RX ORDER — LIDOCAINE HYDROCHLORIDE 20 MG/ML
INJECTION, SOLUTION INFILTRATION; PERINEURAL AS NEEDED
Status: DISCONTINUED | OUTPATIENT
Start: 2024-07-15 | End: 2024-07-15

## 2024-07-15 RX ORDER — HYDROMORPHONE HYDROCHLORIDE 1 MG/ML
0.2 INJECTION, SOLUTION INTRAMUSCULAR; INTRAVENOUS; SUBCUTANEOUS EVERY 5 MIN PRN
Status: DISCONTINUED | OUTPATIENT
Start: 2024-07-15 | End: 2024-07-15 | Stop reason: HOSPADM

## 2024-07-15 RX ORDER — DEXTROSE, SODIUM CHLORIDE, SODIUM LACTATE, POTASSIUM CHLORIDE, AND CALCIUM CHLORIDE 5; .6; .31; .03; .02 G/100ML; G/100ML; G/100ML; G/100ML; G/100ML
100 INJECTION, SOLUTION INTRAVENOUS CONTINUOUS
Status: DISCONTINUED | OUTPATIENT
Start: 2024-07-15 | End: 2024-07-16

## 2024-07-15 RX ORDER — DEXTROSE 50 % IN WATER (D50W) INTRAVENOUS SYRINGE
12.5
Status: DISCONTINUED | OUTPATIENT
Start: 2024-07-15 | End: 2024-07-16

## 2024-07-15 RX ORDER — NORETHINDRONE AND ETHINYL ESTRADIOL 0.5-0.035
KIT ORAL AS NEEDED
Status: DISCONTINUED | OUTPATIENT
Start: 2024-07-15 | End: 2024-07-15

## 2024-07-15 RX ORDER — PROPOFOL 10 MG/ML
INJECTION, EMULSION INTRAVENOUS AS NEEDED
Status: DISCONTINUED | OUTPATIENT
Start: 2024-07-15 | End: 2024-07-15

## 2024-07-15 RX ORDER — ESMOLOL HYDROCHLORIDE 10 MG/ML
INJECTION INTRAVENOUS AS NEEDED
Status: DISCONTINUED | OUTPATIENT
Start: 2024-07-15 | End: 2024-07-15

## 2024-07-15 RX ORDER — OXYCODONE HYDROCHLORIDE 5 MG/1
10 TABLET ORAL EVERY 4 HOURS PRN
Status: DISCONTINUED | OUTPATIENT
Start: 2024-07-15 | End: 2024-07-15 | Stop reason: HOSPADM

## 2024-07-15 RX ORDER — BUPIVACAINE HYDROCHLORIDE 5 MG/ML
INJECTION, SOLUTION PERINEURAL AS NEEDED
Status: DISCONTINUED | OUTPATIENT
Start: 2024-07-15 | End: 2024-07-15 | Stop reason: HOSPADM

## 2024-07-15 RX ORDER — LIDOCAINE HYDROCHLORIDE 10 MG/ML
0.1 INJECTION, SOLUTION EPIDURAL; INFILTRATION; INTRACAUDAL; PERINEURAL ONCE
Status: DISCONTINUED | OUTPATIENT
Start: 2024-07-15 | End: 2024-07-15 | Stop reason: HOSPADM

## 2024-07-15 RX ORDER — ONDANSETRON HYDROCHLORIDE 2 MG/ML
4 INJECTION, SOLUTION INTRAVENOUS ONCE AS NEEDED
Status: DISCONTINUED | OUTPATIENT
Start: 2024-07-15 | End: 2024-07-15 | Stop reason: HOSPADM

## 2024-07-15 RX ORDER — ALBUMIN HUMAN 50 G/1000ML
SOLUTION INTRAVENOUS AS NEEDED
Status: DISCONTINUED | OUTPATIENT
Start: 2024-07-15 | End: 2024-07-15

## 2024-07-15 RX ORDER — MIDAZOLAM HYDROCHLORIDE 1 MG/ML
INJECTION INTRAMUSCULAR; INTRAVENOUS AS NEEDED
Status: DISCONTINUED | OUTPATIENT
Start: 2024-07-15 | End: 2024-07-15

## 2024-07-15 RX ORDER — ROCURONIUM BROMIDE 10 MG/ML
INJECTION, SOLUTION INTRAVENOUS AS NEEDED
Status: DISCONTINUED | OUTPATIENT
Start: 2024-07-15 | End: 2024-07-15

## 2024-07-15 RX ORDER — ACETAMINOPHEN 10 MG/ML
1000 INJECTION, SOLUTION INTRAVENOUS EVERY 6 HOURS SCHEDULED
Status: DISCONTINUED | OUTPATIENT
Start: 2024-07-16 | End: 2024-07-15

## 2024-07-15 RX ORDER — OXYCODONE HYDROCHLORIDE 5 MG/1
5 TABLET ORAL EVERY 4 HOURS PRN
Status: DISCONTINUED | OUTPATIENT
Start: 2024-07-15 | End: 2024-07-15 | Stop reason: HOSPADM

## 2024-07-15 RX ORDER — OXYCODONE HCL 5 MG/5 ML
10 SOLUTION, ORAL ORAL
Status: CANCELLED | OUTPATIENT
Start: 2024-07-15

## 2024-07-15 RX ORDER — ONDANSETRON HYDROCHLORIDE 2 MG/ML
INJECTION, SOLUTION INTRAVENOUS AS NEEDED
Status: DISCONTINUED | OUTPATIENT
Start: 2024-07-15 | End: 2024-07-15

## 2024-07-15 RX ORDER — SODIUM CHLORIDE, SODIUM LACTATE, POTASSIUM CHLORIDE, CALCIUM CHLORIDE 600; 310; 30; 20 MG/100ML; MG/100ML; MG/100ML; MG/100ML
100 INJECTION, SOLUTION INTRAVENOUS CONTINUOUS
Status: DISCONTINUED | OUTPATIENT
Start: 2024-07-15 | End: 2024-07-15 | Stop reason: HOSPADM

## 2024-07-15 SDOH — HEALTH STABILITY: MENTAL HEALTH: CURRENT SMOKER: 0

## 2024-07-15 ASSESSMENT — PAIN SCALES - GENERAL
PAINLEVEL_OUTOF10: 3
PAINLEVEL_OUTOF10: 0 - NO PAIN
PAINLEVEL_OUTOF10: 5 - MODERATE PAIN
PAINLEVEL_OUTOF10: 3
PAINLEVEL_OUTOF10: 2
PAINLEVEL_OUTOF10: 0 - NO PAIN

## 2024-07-15 ASSESSMENT — PAIN - FUNCTIONAL ASSESSMENT
PAIN_FUNCTIONAL_ASSESSMENT: 0-10

## 2024-07-15 NOTE — ANESTHESIA PROCEDURE NOTES
Airway  Date/Time: 7/15/2024 1:59 PM  Urgency: elective    Airway not difficult    Staffing  Performed: attending   Authorized by: Micheal Manning MD    Performed by: Delon Bravo MD  Patient location during procedure: OR    Indications and Patient Condition  Indications for airway management: anesthesia  Spontaneous Ventilation: absent  Sedation level: deep  Preoxygenated: yes  Patient position: sniffing  MILS not maintained throughout  Mask difficulty assessment: 1 - vent by mask  Planned trial extubation    Final Airway Details  Final airway type: endotracheal airway      Successful airway: ETT  Cuffed: yes   Successful intubation technique: video laryngoscopy  Facilitating devices/methods: intubating stylet  Endotracheal tube insertion site: oral  Blade size: #3  ETT size (mm): 7.0  Cormack-Lehane Classification: grade I - full view of glottis  Placement verified by: chest auscultation and capnometry   Inital cuff pressure (cm H2O): 10  Measured from: lips  ETT to lips (cm): 21  Number of attempts at approach: 1

## 2024-07-15 NOTE — CARE PLAN
The patient's goals for the shift include patient will be HDS    The clinical goals for the shift include patient will be HDS    Over the shift, the patient did  make progress toward the following goals.   Problem: Pain  Goal: Takes deep breaths with improved pain control throughout the shift  Outcome: Progressing  Goal: Turns in bed with improved pain control throughout the shift  Outcome: Progressing  Goal: Walks with improved pain control throughout the shift  Outcome: Progressing  Goal: Performs ADL's with improved pain control throughout shift  Outcome: Progressing  Goal: Participates in PT with improved pain control throughout the shift  Outcome: Progressing  Goal: Free from opioid side effects throughout the shift  Outcome: Progressing  Goal: Free from acute confusion related to pain meds throughout the shift  Outcome: Progressing

## 2024-07-15 NOTE — INTERVAL H&P NOTE
H&P reviewed. The patient was examined and there are no changes to the H&P. We plan for EGD and Paraesophageal Hernia Repair.

## 2024-07-15 NOTE — ANESTHESIA POSTPROCEDURE EVALUATION
Patient: Stuart Scroggy    Procedure Summary       Date: 07/15/24 Room / Location: [facility] OR 01 / Virtual [facility] OR    Anesthesia Start: 1346 Anesthesia Stop: 1706    Procedures:       Esophagogastroduodenoscopy (Esophagus)      Laparoscopic Paraesophageal Hernia Repair (Abdomen) Diagnosis:       Gastric volvulus      Paraesophageal hernia      (Gastric volvulus [K31.89])      (Paraesophageal hernia [K44.9])    Surgeons: Terrell Dinh MD Responsible Provider: Felipa Howard MD    Anesthesia Type: general ASA Status: 3            Anesthesia Type: general    Vitals Value Taken Time   /88 07/15/24 1706   Temp 36.3 07/15/24 1706   Pulse 75 07/15/24 1700   Resp 16 07/15/24 1700   SpO2 100 % 07/15/24 1700   Vitals shown include unfiled device data.    Anesthesia Post Evaluation    Patient location during evaluation: PACU  Patient participation: complete - patient participated  Level of consciousness: sedated, sleepy but conscious and awake  Pain management: adequate  Airway patency: patent  Cardiovascular status: acceptable  Respiratory status: acceptable and face mask  Hydration status: acceptable  Postoperative Nausea and Vomiting: none      No notable events documented.

## 2024-07-15 NOTE — PROGRESS NOTES
Stuart Youssef is a 79 y.o. female on day 1 of admission presenting with Abdominal pain.    DC Plannin/15:   Went in and met with the pt, confirmed demographics.   Pt going to OR today.      :   No home going needs anticipated for this pt at this time.      PCP: Mary Vargas Case    ADOD:     This TCC will continue to follow for home going needs and safe DC plan.            NATASHA OLIVERA

## 2024-07-15 NOTE — ANESTHESIA PREPROCEDURE EVALUATION
Patient: Stuart Scroggy    Procedure Information       Anesthesia Start Date/Time: 07/15/24 1346    Procedures:       Esophagogastroduodenoscopy (Esophagus)      Repair Diaphragmatic Hernia Laparoscopy (Abdomen) - Paraesophageal Hernia Repair    Location: [facility] OR 01 / [facility] OR    Surgeons: Terrell Dinh MD            Relevant Problems   GI   (+) Oropharyngeal dysphagia   (+) Paraesophageal hernia      Liver   (+) Cancer of oral cavity (Multi)      Endocrine   (+) Hypothyroidism (acquired)       Clinical information reviewed:   Tobacco  Allergies  Meds   Med Hx  Surg Hx   Fam Hx  Soc Hx        NPO Detail:  NPO/Void Status  Date of Last Liquid: 07/13/24  Date of Last Solid: 07/13/24  Last Intake Type: Clear fluids         Physical Exam    Airway  Mallampati: III  TM distance: >3 FB  Neck ROM: limited     Cardiovascular - normal exam     Dental   Comments: Several chipped teeth   Pulmonary - normal exam     Abdominal - normal exam             Anesthesia Plan    History of general anesthesia?: yes  History of complications of general anesthesia?: no    ASA 3     general     The patient is not a current smoker.    intravenous induction   Trial extubation is planned.  Anesthetic plan and risks discussed with patient.  Use of blood products discussed with patient who consented to blood products.    Plan discussed with resident.

## 2024-07-15 NOTE — CARE PLAN
Problem: Pain  Goal: Takes deep breaths with improved pain control throughout the shift  Outcome: Progressing  Goal: Turns in bed with improved pain control throughout the shift  Outcome: Progressing  Goal: Walks with improved pain control throughout the shift  Outcome: Progressing  Goal: Performs ADL's with improved pain control throughout shift  Outcome: Progressing  Goal: Participates in PT with improved pain control throughout the shift  Outcome: Progressing  Goal: Free from opioid side effects throughout the shift  Outcome: Progressing  Goal: Free from acute confusion related to pain meds throughout the shift  Outcome: Progressing   The patient's goals for the shift include patient will be HDS    The clinical goals for the shift include patient will be HDS

## 2024-07-15 NOTE — OP NOTE
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]     * Paraesophageal hernia [K44.9]     Procedures  Laparoscopic Paraesophageal Hernia Repair  Esophagogastroduodenoscopy    Surgeons      * Terrell Dinh - Primary    Resident/Fellow/Other Assistant:  Surgeons and Role:     * Eagle Lemon MD - Resident - Assisting    Procedure Summary  Anesthesia: General  ASA: III  Anesthesia Staff: Anesthesiologist: Beth Almanza MD; Micheal Manning MD; Felipa Howard MD  Anesthesia Resident: Delon Bravo MD  Estimated Blood Loss: 10mL  Intra-op Medications:   Administrations occurring from 1402 to 1712 on 07/15/24:   Medication Name Total Dose   BUPivacaine HCl (Marcaine) 0.5 % (5 mg/mL) injection 11 mL   surgical lubricant gel 1 Application   sterile water irrigation solution 50 mL              Anesthesia Record               Intraprocedure I/O Totals       None           Specimen: No specimens collected     Staff:   Circulator: Valentin  Scrub Person: Chase  Relief Circulator: [place]  Relief Scrub: [place]  Relief Scrub: Angeli         Drains and/or Catheters:   NG/OG/Feeding Tube 14 Fr Right nostril (Active)   Tube Status Low intermittent suction 07/15/24 1235   Placement Verification Gastric contents 24   Site Assessment Clean;Dry;Intact 07/15/24 1235   Drainage Appearance Brown;Green 07/15/24 0845   NG/OG Interventions Air injected into blue air vent port 24   Response To Intervention No resistance met 24   Tube Securement Taped to nostril center 07/15/24 0845   Output (mL) 600 mL 07/15/24 1230       Tourniquet Times:         Implants:     Findings: moderate/large  paraesophageal hernia with majority of stomach intrathoracic, hill grade 1 valve post repair    Indications: Stuart Youssef is an 79 y.o. 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     The patient was seen in the preoperative area. The risks, benefits, complications, treatment options, non-operative alternatives, expected recovery and outcomes were discussed with the patient. The possibilities of reaction to medication, pulmonary aspiration, injury to surrounding structures, bleeding, recurrent infection, the need for additional procedures, failure to diagnose a condition, and creating a complication requiring transfusion or operation were discussed with the patient. The patient concurred with the proposed plan, giving informed consent.  The site of surgery was properly noted/marked if necessary per policy. The patient has been actively warmed in preoperative area. Preoperative antibiotics have been ordered and given within 1 hours of incision. Venous thrombosis prophylaxis have been ordered including bilateral sequential compression devices    Procedure Details:   Patient was taken to the operating room and general anesthesia was induced.  After a surgical timeout, an EGD was performed which did show a moderate to large-sized paraesophageal hernia with no evidence of neoplasm.  The scope was then withdrawn to the cervical esophagus.  The abdomen was then prepped and draped in the usual sterile fashion.  A longitudinal supraumbilical incision was made and the abdomen was entered under direct visualization using an optical trocar.  After successful entry into the abdomen the abdomen was insufflated and there were no apparent injuries to abdominal access.  Additional 5 mm trocars were placed in the right lower quadrant, left lower quadrant, epigastric region, and an 11 mm trocar was placed in the left upper quadrant.   The laparoscopic liver retractor was then placed  into the abdomen through the right flank port and the liver was retracted to expose the hiatus.  There was evidence of a moderate to large size paraesophageal hernia.  Dissection was started by dividing the pars flaccida.  The peritoneum overlying the right nicky was then divided.  The crural muscle fibers were then  from the hernia sac and the peritoneal incision was extended superiorly and inferiorly.  Gentle traction was then applied to the hernia sac and the dissection proceeded superiorly into the mediastinum.  The dissection was then continued anteriorly over the esophagus and the peritoneal lining was again incised.  The left nicky was then identified and the peritoneum overlying the left incky was then incised taking care to avoid injury to the esophagus and stomach.  The dissection was continued posteriorly on the left nicky with additional reduction of the hernia sac.  Dissection was then performed posterior to the esophagus which then connected the right and left dissection planes.  A Penrose was then introduced into the abdomen and placed through this space to aid in applying appropriate tension.  The dissection was continued superiorly into the mediastinum circumferentially around the esophagus freed from the surrounding pleura and mediastinum.  After this dissection approximately 3 cm of intra-abdominal esophagus was achieved without undue tension on the Penrose we were satisfied with the hernia reduction and esophageal dissection.  Attention was then turned to crural closure.  Starting posteriorly, the crura was closed with a running permanent 0 V-lock suture.  This closure was continued anteriorly making sure to not impinge on the esophagus itself.  Once closed, the suture was then ran again posteriorly as a second locking row of sutures.  The Penrose drain was completely loosened and a repeat EGD was performed.  This showed no evidence of esophageal mucosal injury.  The esophagus advanced into  the stomach without any undue resistance.  On retroflexion there was indeed a Hill grade 1 valve and therefore no fundoplication was deemed necessary.  The stomach was fully suctioned and the endoscope was removed.  The fasica of the 11mm LUQ port was closed with an 0 PDS suture using a laparoscopic suture passer. The liver retractor was then removed under direct visualization and all trocars were removed also under direct visualization.  The skin of all ports were then closed with 4-0 Vicryl suture and skin glue was applied as a dressing.  The patient was awoken from anesthesia and taken the PACU in stable condition.  There were no immediate complications the patient tolerated the procedure well.      Complications:  None; patient tolerated the procedure well.    Disposition: PACU - hemodynamically stable.  Condition: stable       Attending Attestation: I was present and scrubbed for the entire procedure.    Terrell Dinh  Phone Number: [phone]

## 2024-07-16 ENCOUNTER — APPOINTMENT (OUTPATIENT)
Dept: RADIOLOGY | Facility: HOSPITAL | Age: 79
End: 2024-07-16
Payer: MEDICARE

## 2024-07-16 LAB
ALBUMIN SERPL BCP-MCNC: 3.3 G/DL (ref 3.4–5)
ALP SERPL-CCNC: 37 U/L (ref 33–136)
ALT SERPL W P-5'-P-CCNC: 13 U/L (ref 7–45)
ANION GAP SERPL CALC-SCNC: 10 MMOL/L (ref 10–20)
AST SERPL W P-5'-P-CCNC: 25 U/L (ref 9–39)
BILIRUB SERPL-MCNC: 0.5 MG/DL (ref 0–1.2)
BUN SERPL-MCNC: 16 MG/DL (ref 6–23)
CALCIUM SERPL-MCNC: 8.4 MG/DL (ref 8.6–10.6)
CHLORIDE SERPL-SCNC: 106 MMOL/L (ref 98–107)
CO2 SERPL-SCNC: 34 MMOL/L (ref 21–32)
CREAT SERPL-MCNC: 0.83 MG/DL (ref 0.5–1.05)
EGFRCR SERPLBLD CKD-EPI 2021: 71 ML/MIN/1.73M*2
GLUCOSE BLD MANUAL STRIP-MCNC: 181 MG/DL (ref 74–99)
GLUCOSE BLD MANUAL STRIP-MCNC: 188 MG/DL (ref 74–99)
GLUCOSE SERPL-MCNC: 192 MG/DL (ref 74–99)
POTASSIUM SERPL-SCNC: 3.7 MMOL/L (ref 3.5–5.3)
PROT SERPL-MCNC: 5.5 G/DL (ref 6.4–8.2)
SODIUM SERPL-SCNC: 146 MMOL/L (ref 136–145)

## 2024-07-16 PROCEDURE — 2500000004 HC RX 250 GENERAL PHARMACY W/ HCPCS (ALT 636 FOR OP/ED)

## 2024-07-16 PROCEDURE — 2500000001 HC RX 250 WO HCPCS SELF ADMINISTERED DRUGS (ALT 637 FOR MEDICARE OP): Performed by: STUDENT IN AN ORGANIZED HEALTH CARE EDUCATION/TRAINING PROGRAM

## 2024-07-16 PROCEDURE — 2500000004 HC RX 250 GENERAL PHARMACY W/ HCPCS (ALT 636 FOR OP/ED): Performed by: NURSE PRACTITIONER

## 2024-07-16 PROCEDURE — 82947 ASSAY GLUCOSE BLOOD QUANT: CPT

## 2024-07-16 PROCEDURE — 36415 COLL VENOUS BLD VENIPUNCTURE: CPT | Performed by: STUDENT IN AN ORGANIZED HEALTH CARE EDUCATION/TRAINING PROGRAM

## 2024-07-16 PROCEDURE — C9113 INJ PANTOPRAZOLE SODIUM, VIA: HCPCS | Performed by: STUDENT IN AN ORGANIZED HEALTH CARE EDUCATION/TRAINING PROGRAM

## 2024-07-16 PROCEDURE — 84075 ASSAY ALKALINE PHOSPHATASE: CPT | Performed by: STUDENT IN AN ORGANIZED HEALTH CARE EDUCATION/TRAINING PROGRAM

## 2024-07-16 PROCEDURE — 1200000002 HC GENERAL ROOM WITH TELEMETRY DAILY

## 2024-07-16 PROCEDURE — 2500000004 HC RX 250 GENERAL PHARMACY W/ HCPCS (ALT 636 FOR OP/ED): Performed by: STUDENT IN AN ORGANIZED HEALTH CARE EDUCATION/TRAINING PROGRAM

## 2024-07-16 PROCEDURE — 2500000001 HC RX 250 WO HCPCS SELF ADMINISTERED DRUGS (ALT 637 FOR MEDICARE OP)

## 2024-07-16 RX ORDER — DEXTROMETHORPHAN/PSEUDOEPHED 2.5-7.5/.8
40 DROPS ORAL 4 TIMES DAILY PRN
Status: DISCONTINUED | OUTPATIENT
Start: 2024-07-16 | End: 2024-07-17 | Stop reason: HOSPADM

## 2024-07-16 RX ORDER — ONDANSETRON HYDROCHLORIDE 2 MG/ML
4 INJECTION, SOLUTION INTRAVENOUS EVERY 8 HOURS PRN
Status: DISCONTINUED | OUTPATIENT
Start: 2024-07-16 | End: 2024-07-17 | Stop reason: HOSPADM

## 2024-07-16 RX ORDER — IBUPROFEN 400 MG/1
200 TABLET ORAL EVERY 6 HOURS PRN
Status: DISCONTINUED | OUTPATIENT
Start: 2024-07-16 | End: 2024-07-16

## 2024-07-16 RX ORDER — PANTOPRAZOLE SODIUM 40 MG/1
40 TABLET, DELAYED RELEASE ORAL
Qty: 30 TABLET | Refills: 0 | Status: SHIPPED | OUTPATIENT
Start: 2024-07-17 | End: 2024-08-16

## 2024-07-16 RX ORDER — LEVOTHYROXINE SODIUM 50 UG/1
25 TABLET ORAL DAILY
Status: DISCONTINUED | OUTPATIENT
Start: 2024-07-16 | End: 2024-07-17 | Stop reason: HOSPADM

## 2024-07-16 RX ORDER — PANTOPRAZOLE SODIUM 40 MG/10ML
40 INJECTION, POWDER, LYOPHILIZED, FOR SOLUTION INTRAVENOUS
Status: DISCONTINUED | OUTPATIENT
Start: 2024-07-16 | End: 2024-07-17 | Stop reason: HOSPADM

## 2024-07-16 RX ORDER — POTASSIUM CHLORIDE 14.9 MG/ML
20 INJECTION INTRAVENOUS ONCE
Status: COMPLETED | OUTPATIENT
Start: 2024-07-16 | End: 2024-07-16

## 2024-07-16 RX ORDER — KETOROLAC TROMETHAMINE 30 MG/ML
15 INJECTION, SOLUTION INTRAMUSCULAR; INTRAVENOUS EVERY 6 HOURS
Status: COMPLETED | OUTPATIENT
Start: 2024-07-16 | End: 2024-07-17

## 2024-07-16 RX ORDER — GABAPENTIN 300 MG/1
300 CAPSULE ORAL 3 TIMES DAILY
Status: DISCONTINUED | OUTPATIENT
Start: 2024-07-16 | End: 2024-07-17 | Stop reason: HOSPADM

## 2024-07-16 RX ORDER — SENNOSIDES 8.8 MG/5ML
10 LIQUID ORAL 2 TIMES DAILY
Status: DISCONTINUED | OUTPATIENT
Start: 2024-07-16 | End: 2024-07-17 | Stop reason: HOSPADM

## 2024-07-16 RX ORDER — ONDANSETRON 4 MG/1
4 TABLET, FILM COATED ORAL EVERY 8 HOURS PRN
Status: DISCONTINUED | OUTPATIENT
Start: 2024-07-16 | End: 2024-07-17 | Stop reason: HOSPADM

## 2024-07-16 RX ORDER — HYDRALAZINE HYDROCHLORIDE 20 MG/ML
5 INJECTION INTRAMUSCULAR; INTRAVENOUS ONCE AS NEEDED
Status: COMPLETED | OUTPATIENT
Start: 2024-07-16 | End: 2024-07-16

## 2024-07-16 RX ORDER — TRAMADOL HYDROCHLORIDE 50 MG/1
25 TABLET ORAL EVERY 6 HOURS PRN
Status: DISCONTINUED | OUTPATIENT
Start: 2024-07-16 | End: 2024-07-16

## 2024-07-16 RX ORDER — TRAMADOL HYDROCHLORIDE 50 MG/1
50 TABLET ORAL EVERY 6 HOURS PRN
Status: DISCONTINUED | OUTPATIENT
Start: 2024-07-16 | End: 2024-07-16

## 2024-07-16 RX ORDER — HEPARIN SODIUM 5000 [USP'U]/ML
5000 INJECTION, SOLUTION INTRAVENOUS; SUBCUTANEOUS EVERY 8 HOURS
Status: DISCONTINUED | OUTPATIENT
Start: 2024-07-16 | End: 2024-07-17 | Stop reason: HOSPADM

## 2024-07-16 RX ORDER — SODIUM CHLORIDE, SODIUM LACTATE, POTASSIUM CHLORIDE, CALCIUM CHLORIDE 600; 310; 30; 20 MG/100ML; MG/100ML; MG/100ML; MG/100ML
75 INJECTION, SOLUTION INTRAVENOUS CONTINUOUS
Status: DISCONTINUED | OUTPATIENT
Start: 2024-07-16 | End: 2024-07-17

## 2024-07-16 RX ORDER — ALBUTEROL SULFATE 0.83 MG/ML
2.5 SOLUTION RESPIRATORY (INHALATION) EVERY 4 HOURS PRN
Status: DISCONTINUED | OUTPATIENT
Start: 2024-07-16 | End: 2024-07-17 | Stop reason: HOSPADM

## 2024-07-16 RX ORDER — HYDROMORPHONE HYDROCHLORIDE 1 MG/ML
0.2 INJECTION, SOLUTION INTRAMUSCULAR; INTRAVENOUS; SUBCUTANEOUS EVERY 4 HOURS PRN
Status: DISCONTINUED | OUTPATIENT
Start: 2024-07-16 | End: 2024-07-16

## 2024-07-16 RX ORDER — IPRATROPIUM BROMIDE AND ALBUTEROL SULFATE 2.5; .5 MG/3ML; MG/3ML
3 SOLUTION RESPIRATORY (INHALATION)
Status: DISCONTINUED | OUTPATIENT
Start: 2024-07-16 | End: 2024-07-16

## 2024-07-16 RX ORDER — PANTOPRAZOLE SODIUM 40 MG/1
40 TABLET, DELAYED RELEASE ORAL
Status: DISCONTINUED | OUTPATIENT
Start: 2024-07-16 | End: 2024-07-17 | Stop reason: HOSPADM

## 2024-07-16 RX ORDER — DIPHENHYDRAMINE HYDROCHLORIDE 50 MG/ML
50 INJECTION INTRAMUSCULAR; INTRAVENOUS ONCE
Status: COMPLETED | OUTPATIENT
Start: 2024-07-17 | End: 2024-07-17

## 2024-07-16 ASSESSMENT — PAIN SCALES - GENERAL
PAINLEVEL_OUTOF10: 8
PAINLEVEL_OUTOF10: 3
PAINLEVEL_OUTOF10: 1
PAINLEVEL_OUTOF10: 0 - NO PAIN
PAINLEVEL_OUTOF10: 7

## 2024-07-16 ASSESSMENT — COGNITIVE AND FUNCTIONAL STATUS - GENERAL
CLIMB 3 TO 5 STEPS WITH RAILING: A LITTLE
MOBILITY SCORE: 20
HELP NEEDED FOR BATHING: A LITTLE
WALKING IN HOSPITAL ROOM: A LITTLE
MOVING TO AND FROM BED TO CHAIR: A LITTLE
MOBILITY SCORE: 18
HELP NEEDED FOR BATHING: A LITTLE
DAILY ACTIVITIY SCORE: 21
WALKING IN HOSPITAL ROOM: A LITTLE
TURNING FROM BACK TO SIDE WHILE IN FLAT BAD: A LITTLE
MOVING TO AND FROM BED TO CHAIR: A LITTLE
TOILETING: A LITTLE
MOVING TO AND FROM BED TO CHAIR: A LITTLE
STANDING UP FROM CHAIR USING ARMS: A LITTLE
DAILY ACTIVITIY SCORE: 20
CLIMB 3 TO 5 STEPS WITH RAILING: A LITTLE
CLIMB 3 TO 5 STEPS WITH RAILING: A LOT
DRESSING REGULAR UPPER BODY CLOTHING: A LITTLE
DRESSING REGULAR LOWER BODY CLOTHING: A LITTLE
DRESSING REGULAR LOWER BODY CLOTHING: A LITTLE
STANDING UP FROM CHAIR USING ARMS: A LITTLE
DRESSING REGULAR UPPER BODY CLOTHING: A LITTLE
DAILY ACTIVITIY SCORE: 21
MOBILITY SCORE: 20
STANDING UP FROM CHAIR USING ARMS: A LITTLE
DRESSING REGULAR LOWER BODY CLOTHING: A LITTLE
HELP NEEDED FOR BATHING: A LITTLE
DRESSING REGULAR UPPER BODY CLOTHING: A LITTLE
WALKING IN HOSPITAL ROOM: A LITTLE

## 2024-07-16 ASSESSMENT — PAIN DESCRIPTION - DESCRIPTORS
DESCRIPTORS: ACHING;DISCOMFORT
DESCRIPTORS: ACHING;DISCOMFORT

## 2024-07-16 ASSESSMENT — PAIN - FUNCTIONAL ASSESSMENT
PAIN_FUNCTIONAL_ASSESSMENT: 0-10

## 2024-07-16 ASSESSMENT — PAIN DESCRIPTION - ORIENTATION: ORIENTATION: LEFT

## 2024-07-16 ASSESSMENT — PAIN DESCRIPTION - LOCATION: LOCATION: ABDOMEN

## 2024-07-16 NOTE — HOSPITAL COURSE
79-year-old female with h/o hypothyroidism, HTN, HLD, oral cancer s/p resection and radiation presented as a transfer for concern for gastric volvulus.  CT A/P at outside hospital revealed a large hiatal hernia with gastric body, antrum, and pylorus in the lower mediastinum. NG tube was placed for decompression. She underwent EGD, laparoscopic paraesophageal hernia repair on 7/15 with Dr. Dinh. Procedure was tolerated well and she was started on clear liquid diet.  On post-op day 2 following premedication due to reported contrast allergy, she underwent upper GI study which showed no contrast extravasation to suggest leak.  Diet was advanced to full liquid. Upon discharge, she had minimal pain, tolerating full liquid diet, voiding and with baseline activity. She was discharged home with outpatient surgical follow up scheduled.

## 2024-07-16 NOTE — PROGRESS NOTES
"Subjective:  Stuart Youssef  is a 79 y.o. yo female who is POD# 1 after laparoscopic paraesophageal hernia repair. Pain is well controlled. Patient has been passing flatus. Patient has been OOB.  Patient endorses an allergy to contrast.  Physical Exam  Vitals reviewed.   Constitutional:       General: She is not in acute distress.  HENT:      Head: Normocephalic and atraumatic.     Cardiovascular:      Rate and Rhythm: Normal rate  Pulmonary:      Effort: Pulmonary effort is normal. No respiratory distress.      Comments: Room air  Abdominal:   Incisions are clean dry and intact abdomen is soft. minimal tenderness to palpation  Skin:     General: Skin is warm and dry.   Neurological:      Mental Status: She is alert  Psychiatric:         Mood and Affect: Mood normal.         Behavior: Behavior normal.     Last Recorded Vitals  /70 (BP Location: Left arm, Patient Position: Lying)   Pulse 64   Temp 36.3 °C (97.3 °F) (Temporal)   Resp 18   Ht 1.575 m (5' 2\")   Wt 82.6 kg (182 lb)   SpO2 94%   BMI 33.29 kg/m²       Intake and Output    Intake/Output Summary (Last 24 hours) at 7/16/2024 0842  Last data filed at 7/16/2024 0818  Gross per 24 hour   Intake 1066.67 ml   Output 1420 ml   Net -353.33 ml        Relevant Results  Labs  Results for orders placed or performed during the hospital encounter of 07/14/24 (from the past 24 hour(s))   POCT GLUCOSE   Result Value Ref Range    POCT Glucose 90 74 - 99 mg/dL   POCT GLUCOSE   Result Value Ref Range    POCT Glucose 67 (L) 74 - 99 mg/dL   POCT GLUCOSE   Result Value Ref Range    POCT Glucose 118 (H) 74 - 99 mg/dL   POCT GLUCOSE   Result Value Ref Range    POCT Glucose 112 (H) 74 - 99 mg/dL   POCT GLUCOSE   Result Value Ref Range    POCT Glucose 118 (H) 74 - 99 mg/dL   POCT GLUCOSE   Result Value Ref Range    POCT Glucose 170 (H) 74 - 99 mg/dL   POCT GLUCOSE   Result Value Ref Range    POCT Glucose 188 (H) 74 - 99 mg/dL   POCT GLUCOSE   Result Value Ref Range    " POCT Glucose 181 (H) 74 - 99 mg/dL            Assessment:  Postop day 1 following laparoscopic paraesophageal hernia repair.  Plan:    Neurology: post operative pain  -Gabapentin  - toradol x 6 doses   - No narcotics per pt preference    Cardiovascular:  -Vital signs every 4 hours      Pulmonology:  -IS x10 every hour   -Maintain SpO2 >92 % DENA Grayson    GI:  -Clear diet until upper GI study  - Zofran PRN nausea   - PPI daily  -UGI study tomorrow; following 12-hour contrast allergy prep.    :  - Strict I&O   - Trend RFP and Magnesium, replace electrolytes as needed to maintain K >4, Mag >2.   -mIVF: LR @100    ID: afebrile   -trend daily temperatures and WBC   -Monitor surgical incisions for signs of infection   -Perioperative antibiotics completed    Heme:  -Monitor for signs of acute blood loss  -Trend CBC as needed     Endocrine: Continue home Synthroid 25 mcg tablet    DVT Prophylaxis:  -Continue subcutaneous heparin, SCDs, ambulation/OOB     Disposition:  - RNF        Plan of care discussed with surgical team    Damien Nicole DO PGY-1  General Surgery

## 2024-07-16 NOTE — DISCHARGE INSTRUCTIONS
For pain control: Take 650mg Tylenol every 4-6 hours. You may rotate with ibuprofen as needed.    - Please take the acid reflux medication, Protonix, daily as prescribed for the next 2 weeks until follow up appt with Dr Dinh.     - Follow up with Dr. Dinh in 2 weeks.

## 2024-07-16 NOTE — SIGNIFICANT EVENT
S:    POD 0 from laparoscopic diaphragmatic hernia repair.    States pain is 5/10. Per nursing, she has had acute onset confusion with oxy. Patient denies N/V/SOB/CP. She has not yet voided nor had BM. States she passed flatus twice.     O:   Vital signs are stable, normotensive, afebrile, no new or worsening oxygen requirement, not tachycardic.     Visit Vitals  /81 (BP Location: Left arm, Patient Position: Lying)   Pulse 85   Temp 36.4 °C (97.5 °F) (Temporal)   Resp 18      Lab Results   Component Value Date    WBC 8.6 07/15/2024    HGB 11.9 (L) 07/15/2024    HCT 37.6 07/15/2024    MCV 95 07/15/2024     (L) 07/15/2024       Lab Results   Component Value Date    GLUCOSE 89 07/15/2024    CALCIUM 9.0 07/15/2024     (H) 07/15/2024    K 3.7 07/15/2024    CO2 35 (H) 07/15/2024     07/15/2024    BUN 19 07/15/2024    CREATININE 0.80 07/15/2024         Constitutional: no acute distress, pleasant  Skin: warm and dry overall   Neuro: A/O x4, no gross deficits   HEENT: Atraumatic, no scleral icterus  Cardiac: RRR  Pulmonary: Unlabored respirations on 2L NC  Abdomen: Nondistended, mildly and appropriately tender to palpation, soft.   GI: Yet to void, no villalba.   Surgical Site: 5 laparoscopic port sites c/d/I with dermabond overlying.     A/P:  Overall, patient is doing well postoperatively with no acute concerns.  Will continue to optimize pain control as needed.   Will continue to monitor clinical exam, vitals, I&O's, and labs when available.  Will follow up on the patient in the a.m. or sooner as needed.    Mickey Lynn MD  PGY-1 General Surgery  Acute Care Surgery [id number]

## 2024-07-16 NOTE — CARE PLAN
The patient's goals for the shift include patient will be HDS    The clinical goals for the shift include Patient will remain safe and free from injury throughout shift.

## 2024-07-17 ENCOUNTER — APPOINTMENT (OUTPATIENT)
Dept: RADIOLOGY | Facility: HOSPITAL | Age: 79
End: 2024-07-17
Payer: MEDICARE

## 2024-07-17 VITALS
DIASTOLIC BLOOD PRESSURE: 75 MMHG | WEIGHT: 186.07 LBS | HEIGHT: 62 IN | SYSTOLIC BLOOD PRESSURE: 130 MMHG | HEART RATE: 69 BPM | TEMPERATURE: 96.6 F | OXYGEN SATURATION: 94 % | RESPIRATION RATE: 18 BRPM | BODY MASS INDEX: 34.24 KG/M2

## 2024-07-17 PROCEDURE — 2550000001 HC RX 255 CONTRASTS: Performed by: STUDENT IN AN ORGANIZED HEALTH CARE EDUCATION/TRAINING PROGRAM

## 2024-07-17 PROCEDURE — 2500000001 HC RX 250 WO HCPCS SELF ADMINISTERED DRUGS (ALT 637 FOR MEDICARE OP): Performed by: STUDENT IN AN ORGANIZED HEALTH CARE EDUCATION/TRAINING PROGRAM

## 2024-07-17 PROCEDURE — 2500000004 HC RX 250 GENERAL PHARMACY W/ HCPCS (ALT 636 FOR OP/ED): Performed by: NURSE PRACTITIONER

## 2024-07-17 PROCEDURE — 2500000004 HC RX 250 GENERAL PHARMACY W/ HCPCS (ALT 636 FOR OP/ED): Performed by: STUDENT IN AN ORGANIZED HEALTH CARE EDUCATION/TRAINING PROGRAM

## 2024-07-17 PROCEDURE — 74240 X-RAY XM UPR GI TRC 1CNTRST: CPT

## 2024-07-17 PROCEDURE — 74240 X-RAY XM UPR GI TRC 1CNTRST: CPT | Performed by: STUDENT IN AN ORGANIZED HEALTH CARE EDUCATION/TRAINING PROGRAM

## 2024-07-17 PROCEDURE — C9113 INJ PANTOPRAZOLE SODIUM, VIA: HCPCS | Performed by: STUDENT IN AN ORGANIZED HEALTH CARE EDUCATION/TRAINING PROGRAM

## 2024-07-17 PROCEDURE — 99024 POSTOP FOLLOW-UP VISIT: CPT | Performed by: NURSE PRACTITIONER

## 2024-07-17 RX ORDER — ACETAMINOPHEN 325 MG/1
650 TABLET ORAL EVERY 6 HOURS
Status: DISCONTINUED | OUTPATIENT
Start: 2024-07-17 | End: 2024-07-17 | Stop reason: HOSPADM

## 2024-07-17 ASSESSMENT — PAIN SCALES - WONG BAKER
WONGBAKER_NUMERICALRESPONSE: NO HURT

## 2024-07-17 ASSESSMENT — PAIN SCALES - GENERAL
PAINLEVEL_OUTOF10: 0 - NO PAIN

## 2024-07-17 ASSESSMENT — PAIN SCALES - PAIN ASSESSMENT IN ADVANCED DEMENTIA (PAINAD)
FACIALEXPRESSION: SMILING OR INEXPRESSIVE
TOTALSCORE: 0
CONSOLABILITY: NO NEED TO CONSOLE
BREATHING: NORMAL
BODYLANGUAGE: RELAXED

## 2024-07-17 ASSESSMENT — PAIN INTENSITY VAS: VAS_PAIN_BASICVITALS_IP: 0

## 2024-07-17 NOTE — CARE PLAN
The patient's goals for the shift include Labs WNL    The clinical goals for the shift include Labs WNL    Problem: Pain  Goal: Takes deep breaths with improved pain control throughout the shift  Outcome: Progressing  Goal: Turns in bed with improved pain control throughout the shift  Outcome: Progressing  Goal: Walks with improved pain control throughout the shift  Outcome: Progressing  Goal: Performs ADL's with improved pain control throughout shift  Outcome: Progressing  Goal: Participates in PT with improved pain control throughout the shift  Outcome: Progressing  Goal: Free from opioid side effects throughout the shift  Outcome: Progressing  Goal: Free from acute confusion related to pain meds throughout the shift  Outcome: Progressing     Problem: Skin  Goal: Participates in plan/prevention/treatment measures  7/17/2024 0535 by Diana Garnett RN  Outcome: Progressing  7/16/2024 2356 by Diana Garnett RN  Flowsheets (Taken 7/16/2024 2356)  Participates in plan/prevention/treatment measures:   Increase activity/out of bed for meals   Discuss with provider PT/OT consult   Elevate heels  Goal: Prevent/manage excess moisture  7/17/2024 0535 by Diana Garnett RN  Outcome: Progressing  7/16/2024 2356 by Diana Garnett RN  Flowsheets (Taken 7/16/2024 2356)  Prevent/manage excess moisture:   Use wicking fabric (obtain order)   Moisturize dry skin   Cleanse incontinence/protect with barrier cream   Monitor for/manage infection if present   Follow provider orders for dressing changes  Goal: Prevent/minimize sheer/friction injuries  7/17/2024 0535 by Diana Garnett RN  Outcome: Progressing  7/16/2024 2356 by Diana Garnett RN  Flowsheets (Taken 7/16/2024 2356)  Prevent/minimize sheer/friction injuries:   Utilize specialty bed per algorithm   Use pull sheet   Turn/reposition every 2 hours/use positioning/transfer devices   Increase activity/out of bed for meals   HOB 30 degrees or less   Complete  micro-shifts as needed if patient unable. Adjust patient position to relieve pressure points, not a full turn  Goal: Promote/optimize nutrition  7/17/2024 0535 by Diana Garnett RN  Outcome: Progressing  7/16/2024 2356 by Diana Garnett RN  Flowsheets (Taken 7/16/2024 2356)  Promote/optimize nutrition:   Offer water/supplements/favorite foods   Discuss with provider if NPO > 2 days   Assist with feeding   Reassess MST if dietician not consulted   Monitor/record intake including meals   Consume > 50% meals/supplements  Goal: Promote skin healing  7/17/2024 0535 by Diana Garnett RN  Outcome: Progressing  7/16/2024 2356 by Diana Garnett RN  Flowsheets (Taken 7/16/2024 2356)  Promote skin healing:   Turn/reposition every 2 hours/use positioning/transfer devices   Rotate device position/do not position patient on device   Protective dressings over bony prominences   Ensure correct size (line/device) and apply per  instructions   Assess skin/pad under line(s)/device(s)

## 2024-07-17 NOTE — CARE PLAN
The patient's goals for the shift include Labs WNL    The clinical goals for the shift include Labs WNL    Over the shift, the patient did remain safe on unit until discharged safely at 1:51pm Iv access removed everything intact.

## 2024-07-17 NOTE — DISCHARGE SUMMARY
Discharge Diagnosis  Paraesophageal hernia    Issues Requiring Follow-Up  Post-operative follow up    Test Results Pending At Discharge  Pending Labs       No current pending labs.            Hospital Course  79-year-old female with h/o hypothyroidism, HTN, HLD, oral cancer s/p resection and radiation presented as a transfer for concern for gastric volvulus.  CT A/P at outside hospital revealed a large hiatal hernia with gastric body, antrum, and pylorus in the lower mediastinum. NG tube was placed for decompression. She underwent EGD, laparoscopic paraesophageal hernia repair on 7/15 with Dr. Dinh. Procedure was tolerated well and she was started on clear liquid diet.  On post-op day 2 following premedication due to reported contrast allergy, she underwent upper GI study which showed no contrast extravasation to suggest leak.  Diet was advanced to full liquid. Upon discharge, she had minimal pain, tolerating full liquid diet, voiding and with baseline activity. She was discharged home with outpatient surgical follow up scheduled.     Pertinent Physical Exam At Time of Discharge  Physical Exam  Vitals reviewed.   Constitutional:       General: She is not in acute distress.  HENT:      Head: Normocephalic and atraumatic.   Eyes:      Extraocular Movements: Extraocular movements intact.   Cardiovascular:      Rate and Rhythm: Normal rate and regular rhythm.   Pulmonary:      Effort: Pulmonary effort is normal. No respiratory distress.      Comments: On RA   Abdominal:      General: There is no distension.      Palpations: Abdomen is soft.      Tenderness: There is no abdominal tenderness.      Comments: Abdominal lap sites with dermabond D/I, well approximated.    Skin:     General: Skin is warm and dry.   Neurological:      Mental Status: She is alert and oriented to person, place, and time.   Psychiatric:         Mood and Affect: Mood normal.         Behavior: Behavior normal.       Home Medications     Medication  List      START taking these medications     pantoprazole 40 mg EC tablet; Commonly known as: ProtoNix; Take 1 tablet   (40 mg) by mouth once daily in the morning. Take before meals. Do not   crush, chew, or split.     CONTINUE taking these medications     acetaminophen 325 mg tablet; Commonly known as: Tylenol   albuterol 90 mcg/actuation inhaler   Ativan 0.5 mg tablet; Generic drug: LORazepam   biotin 1 mg capsule   candesartan 32 mg tablet; Commonly known as: Atacand   cholecalciferol 50 MCG (2000 UT) tablet; Commonly known as: Vitamin D-3   levothyroxine 20 mcg/mL solution; Commonly known as: Synthroid   lutein 20 mg tablet   Zoloft 100 mg tablet; Generic drug: sertraline       Outpatient Follow-Up  Future Appointments   Date Time Provider Department [place]   7/30/2024 10:00 AM Terrell Dinh MD [id number] [facility]   8/13/2024 10:45 AM Roger Rai MD [id number]       Mamie Mccann, APRN-CNP

## 2024-07-30 ENCOUNTER — OFFICE VISIT (OUTPATIENT)
Dept: SURGERY | Facility: CLINIC | Age: 79
End: 2024-07-30
Payer: MEDICARE

## 2024-07-30 VITALS
WEIGHT: 182.2 LBS | TEMPERATURE: 97.9 F | BODY MASS INDEX: 33.32 KG/M2 | OXYGEN SATURATION: 98 % | SYSTOLIC BLOOD PRESSURE: 153 MMHG | HEART RATE: 75 BPM | DIASTOLIC BLOOD PRESSURE: 85 MMHG

## 2024-07-30 DIAGNOSIS — K44.9 PARAESOPHAGEAL HERNIA: Primary | ICD-10-CM

## 2024-07-30 PROCEDURE — 1125F AMNT PAIN NOTED PAIN PRSNT: CPT | Performed by: STUDENT IN AN ORGANIZED HEALTH CARE EDUCATION/TRAINING PROGRAM

## 2024-07-30 PROCEDURE — 1036F TOBACCO NON-USER: CPT | Performed by: STUDENT IN AN ORGANIZED HEALTH CARE EDUCATION/TRAINING PROGRAM

## 2024-07-30 PROCEDURE — 1159F MED LIST DOCD IN RCRD: CPT | Performed by: STUDENT IN AN ORGANIZED HEALTH CARE EDUCATION/TRAINING PROGRAM

## 2024-07-30 PROCEDURE — 1111F DSCHRG MED/CURRENT MED MERGE: CPT | Performed by: STUDENT IN AN ORGANIZED HEALTH CARE EDUCATION/TRAINING PROGRAM

## 2024-07-30 PROCEDURE — 99211 OFF/OP EST MAY X REQ PHY/QHP: CPT | Performed by: STUDENT IN AN ORGANIZED HEALTH CARE EDUCATION/TRAINING PROGRAM

## 2024-07-30 PROCEDURE — 1157F ADVNC CARE PLAN IN RCRD: CPT | Performed by: STUDENT IN AN ORGANIZED HEALTH CARE EDUCATION/TRAINING PROGRAM

## 2024-07-30 ASSESSMENT — ENCOUNTER SYMPTOMS: DEPRESSION: 0

## 2024-07-30 ASSESSMENT — PAIN SCALES - GENERAL: PAINLEVEL: 5

## 2024-07-30 NOTE — PROGRESS NOTES
History Of Present Illness  Patient is a 79 y.o. female who presents for a postoperative follow-up following a laparoscopic paraesophageal hernia repair.  She recovered well in the immediate postoperative period without any unexpected complications and was subsequently discharged on postoperative day 2.  Since then she has been tolerating her diet without any nausea, vomiting, dysphagia.  She has been maintaining to a liquid and soft diet.  She currently also denies any fevers, chills, chest pain, shortness of breath, constipation, diarrhea.  Her only complaint this morning is some persistent pain in the left upper quadrant port site.  While getting out of the car she noticed a sharp pulling sensation at this port site.  Otherwise doing quite well postoperatively.     Past Medical History  Past Medical History:   Diagnosis Date    Disorder of thyroid, unspecified     Thyroid trouble    Personal history of malignant neoplasm, unspecified     History of malignant neoplasm    Personal history of other diseases of the circulatory system     History of hypertension    Personal history of other diseases of the digestive system     History of hiatal hernia    Personal history of other diseases of the respiratory system     History of asthma       Surgical History  Past Surgical History:   Procedure Laterality Date    OTHER SURGICAL HISTORY  11/30/2021    Appendectomy    OTHER SURGICAL HISTORY  11/30/2021    Hysterectomy    OTHER SURGICAL HISTORY  11/30/2021    Biopsy        Social History  She reports that she has never smoked. She has never used smokeless tobacco. She reports that she does not drink alcohol and does not use drugs.    Family History  No family history on file.     Allergies  Barium sulfate, Ioversol, Oxycodone, Sulfa (sulfonamide antibiotics), and Sulfanilamide    Review of Systems  - CONSTITUTIONAL: Denies fever and chills.  - HEENT: Denies changes in vision and hearing.  - RESPIRATORY: Denies SOB and  cough.  - CV: Denies palpitations and CP.  - GI: Denies abdominal pain, nausea, vomiting and diarrhea.  - : Denies dysuria and urinary frequency.  - MSK: Denies myalgia and joint pain.  - SKIN: Denies rash and pruritus.  - NEUROLOGICAL: Denies headache and syncope.  - PSYCHIATRIC: Denies recent changes in mood. Denies anxiety and depression.     Physical Exam  - GENERAL: Alert and oriented x 3. No acute distress. Well-nourished.  - EYES: EOMI. Anicteric.  - HENT: Moist mucous membranes. No scleral icterus. No cervical lymphadenopathy.  - LUNGS: Breathing comfortably on room air. No accessory muscle use, no distress.  - CARDIOVASCULAR: Regular rate and rhythm. No murmur. No JVD.  - ABDOMEN: Soft, non-tender and non-distended. No palpable masses.  All incisions healing well without surrounding erythema, induration, palpable fluid collection.  Residual skin glue present.  Minimal tenderness to palpation to the left upper quadrant port site.  - EXTREMITIES: No edema. Non-tender.  - SKIN: No rashes or lesions. Warm.  - NEUROLOGIC: No focal neurological deficits. CN II-XII grossly intact, but not individually tested.  - PSYCHIATRIC: Cooperative. Appropriate mood and affect.    Last Recorded Vitals  Blood pressure 153/85, pulse 75, temperature 36.6 °C (97.9 °F), weight 82.6 kg (182 lb 3.2 oz), SpO2 98%.    Relevant Results  FL upper GI w KUB    Result Date: 7/18/2024  Interpreted By:  Khanh Cardona,  and Doug [place] STUDY: FL UPPER GI W KUB;  7/17/2024 9:50 am   INDICATION: Signs/Symptoms:s/p paraesophageal hernia repair; 12 hour contrast allergy prep given.   COMPARISON: None.   ACCESSION NUMBER(S): [id number]   ORDERING CLINICIAN: SERA PENA   TECHNIQUE: Initial  radiograph of the esophagus was obtained.  Multiple fluoroscopic spot images were obtained after the administration of 90mL of  Gastrografin contrast. The patient tolerated the procedure well. Fluoroscopic time was  1.7 minutes.   FINDINGS: Initial   image demonstrates  nonspecific, nonobstructive bowel gas pattern..   Post surgical changes from paraesophageal hernia repair. Fluoroscopic images demonstrate tertiary peristalsis with free flow of contrast through the mid and distal esophagus and past the gastroesophageal junction.  The stomach is distended with fluid. There is no evidence of extraluminal contrast suggestive of leak. Contrast readily enters the duodenal C loop.       1.  Status post paraesophageal hernia repair. No extraluminal contrast extravasation to suggest leak.   I personally reviewed the images/study and I agree with the findings as stated by Lina Camacho MD, PGY-2 this study was interpreted at [place], [place].   MACRO: None   Signed by: Khanh Cardona 7/18/2024 11:01 AM Dictation workstation:   [id number]    XR abdomen 1 view    Result Date: 7/14/2024  Interpreted By:  Efe Haines,  and Jony Espinosa STUDY: XR ABDOMEN 1 VIEW;  7/14/2024 2:06 am   INDICATION: Signs/Symptoms:Confirm NG tube placement.   COMPARISON: None.   ACCESSION NUMBER(S): [id number]   ORDERING CLINICIAN: IRAIS CULLEN   FINDINGS: Enteric tube coursing below the level of the diaphragm with distal tip overlying the left hemiabdomen in the expected location of the gastric fundus/body. Large hiatal hernia overlies the left lower lung zone/left retrocardiac.   Nonobstructive bowel gas pattern. No gross evidence of free air is noted.   Visualized lungs are clear.   Osseous structures demonstrate no acute bony changes.       1.  Enteric tube as above. 2. Nonspecific, nonobstructive bowel gas pattern.   I personally reviewed the images/study and I agree with the findings as stated by Jesús Marino MD (Radiology Resident). This study was interpreted at [place], [place].   MACRO: None   Signed by: Efe Haines 7/14/2024 3:50 AM Dictation  workstation:   [id number]       Assessment and Plan  Patient is a 79 y.o. female who presents for a postoperative follow-up following a laparoscopic paraesophageal hernia repair for an incarcerated paraesophageal hernia.  She has recovered quite well in the postoperative period without any unexpected complications at this time.  She was successfully discharged on postoperative day 2 and is now tolerating her diet.  I did recommend slowly advancing her diet back to a regular diet starting with soft foods then back to regular foods.  I also did explain that pain overlying the left upper quadrant port site can be seen given the fact that it was the largest port requiring fascial closure.  I did further explain that these sutures are absorbable and that this will slowly relax over time.  Her questions were answered at this time she can follow-up as needed.    Terrell Dinh MD

## 2024-08-12 NOTE — PROGRESS NOTES
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 She does not have any evidence of any tumor recurrence.  A chest x-ray will be obtained today.     Dysphagia secondary to dryness and to malocclusion. She seems to be adapting.     Hypothyroidism which is well controlled with medication.     I will see her in 4 months.      Chief Complaint     Follow-up status post surgery for the management of an oral cavity cancer.      History of Present Illness    This lady was seen December 2021 at the request of a local colleague. She was first noticed to have a lesion in the back aspect of her buccal region on the right side back in March 2021. This led to a few procedures where some superficial lesion was removed. She has had ongoing issues and more recently biopsies were obtained and showed invasive squamous cell carcinoma. She had surgery on December 20, 2021. She did undergo a composite resection. She was presented at our tumor board and radiation therapy was recommended. She finished radiation at the end of March 2022. She has been able to swallow by mouth.  She does have some significant restriction in her diet. She is hypothyroid and on thyroid medication. She had a TSH level in April 2024 which was normal. She had a PET scan done in July 2022 that I personally reviewed. I cannot appreciate any evidence of tumor recurrence.  She had a chest x-ray in August 2023 which was negative.  She does have some occasional discomfort around the right jaw.  She just had surgery for a paraesophageal hernia.      Physical Exam    Examination of the oral cavity and oropharynx is negative for any worrisome mucosal lesions. There is good mobility of the tongue. She does have some trismus secondary to some scarring in the buccal area. She does have some malocclusion. She also has significant dryness. Palpation of the parotid, neck, and thyroid field fails to show any worrisome masses or  adenopathies.      A flexible laryngoscopy was carried out. Under topical Xylocaine and Luis M-Synephrine the scope was introduced through the nostril. The nasopharynx, base of tongue, hypopharynx, and larynx are visualized. The vocal cords are normally mobile. There is no pooling of secretions in the piriform sinuses. There is no evidence of any mucosal lesions.

## 2024-08-13 ENCOUNTER — APPOINTMENT (OUTPATIENT)
Dept: OTOLARYNGOLOGY | Facility: CLINIC | Age: 79
End: 2024-08-13
Payer: MEDICARE

## 2024-08-13 ENCOUNTER — HOSPITAL ENCOUNTER (OUTPATIENT)
Dept: RADIOLOGY | Facility: CLINIC | Age: 79
Discharge: HOME | End: 2024-08-13
Payer: MEDICARE

## 2024-08-13 VITALS — BODY MASS INDEX: 32.66 KG/M2 | WEIGHT: 177.5 LBS | HEIGHT: 62 IN

## 2024-08-13 DIAGNOSIS — C06.9 CANCER OF ORAL CAVITY (MULTI): Primary | ICD-10-CM

## 2024-08-13 DIAGNOSIS — R13.12 OROPHARYNGEAL DYSPHAGIA: ICD-10-CM

## 2024-08-13 DIAGNOSIS — C06.9 CANCER OF ORAL CAVITY (MULTI): ICD-10-CM

## 2024-08-13 DIAGNOSIS — E03.9 HYPOTHYROIDISM (ACQUIRED): ICD-10-CM

## 2024-08-13 PROCEDURE — 71046 X-RAY EXAM CHEST 2 VIEWS: CPT | Performed by: RADIOLOGY

## 2024-08-13 PROCEDURE — 1157F ADVNC CARE PLAN IN RCRD: CPT | Performed by: OTOLARYNGOLOGY

## 2024-08-13 PROCEDURE — 1036F TOBACCO NON-USER: CPT | Performed by: OTOLARYNGOLOGY

## 2024-08-13 PROCEDURE — 1159F MED LIST DOCD IN RCRD: CPT | Performed by: OTOLARYNGOLOGY

## 2024-08-13 PROCEDURE — 1160F RVW MEDS BY RX/DR IN RCRD: CPT | Performed by: OTOLARYNGOLOGY

## 2024-08-13 PROCEDURE — 1125F AMNT PAIN NOTED PAIN PRSNT: CPT | Performed by: OTOLARYNGOLOGY

## 2024-08-13 PROCEDURE — 71046 X-RAY EXAM CHEST 2 VIEWS: CPT

## 2024-08-13 PROCEDURE — 99213 OFFICE O/P EST LOW 20 MIN: CPT | Performed by: OTOLARYNGOLOGY

## 2024-08-13 PROCEDURE — 31575 DIAGNOSTIC LARYNGOSCOPY: CPT | Performed by: OTOLARYNGOLOGY

## 2024-08-13 PROCEDURE — 1111F DSCHRG MED/CURRENT MED MERGE: CPT | Performed by: OTOLARYNGOLOGY

## 2024-08-13 ASSESSMENT — PATIENT HEALTH QUESTIONNAIRE - PHQ9
SUM OF ALL RESPONSES TO PHQ9 QUESTIONS 1 AND 2: 0
2. FEELING DOWN, DEPRESSED OR HOPELESS: NOT AT ALL
1. LITTLE INTEREST OR PLEASURE IN DOING THINGS: NOT AT ALL

## 2024-08-13 ASSESSMENT — PAIN SCALES - GENERAL: PAINLEVEL: 2

## 2024-12-09 NOTE — PROGRESS NOTES
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 She does not have any evidence of any tumor recurrence.       Dysphagia secondary to dryness and to malocclusion.  This seems to be stable.    Hypothyroidism which is well controlled with medication.     I will see her in 6 months.      Chief Complaint     Follow-up status post surgery for the management of an oral cavity cancer.      History of Present Illness    This lady was seen December 2021 at the request of a local colleague. She was first noticed to have a lesion in the back aspect of her buccal region on the right side back in March 2021. This led to a few procedures where some superficial lesion was removed. She has had ongoing issues and more recently biopsies were obtained and showed invasive squamous cell carcinoma. She had surgery on December 20, 2021. She did undergo a composite resection. She was presented at our tumor board and radiation therapy was recommended. She finished radiation at the end of March 2022. She has been able to swallow by mouth.  She does have some restriction in her diet. She is hypothyroid and on thyroid medication. She had a TSH level in April 2024 which was normal. She had a PET scan done in July 2022 that was negative.  She had a chest x-ray in August 2024 which was negative.  She does have some occasional discomfort around the right jaw.      Physical Exam    Examination of the oral cavity and oropharynx is negative for any worrisome mucosal lesions. There is good mobility of the tongue. She does have some trismus secondary to some scarring in the buccal area. She does have some malocclusion. She also has significant dryness. Palpation of the parotid, neck, and thyroid field fails to show any worrisome masses or adenopathies.      A flexible laryngoscopy was carried out. Under topical Xylocaine and Luis M-Synephrine the scope was introduced through the nostril. The nasopharynx, base  of tongue, hypopharynx, and larynx are visualized. The vocal cords are normally mobile. There is no pooling of secretions in the piriform sinuses. There is no evidence of any mucosal lesions.

## 2024-12-10 ENCOUNTER — APPOINTMENT (OUTPATIENT)
Dept: OTOLARYNGOLOGY | Facility: CLINIC | Age: 79
End: 2024-12-10
Payer: MEDICARE

## 2024-12-10 VITALS — HEIGHT: 62 IN | BODY MASS INDEX: 34.6 KG/M2 | WEIGHT: 188 LBS

## 2024-12-10 DIAGNOSIS — R13.12 OROPHARYNGEAL DYSPHAGIA: ICD-10-CM

## 2024-12-10 DIAGNOSIS — C06.9 CANCER OF ORAL CAVITY (MULTI): Primary | ICD-10-CM

## 2024-12-10 DIAGNOSIS — E03.9 HYPOTHYROIDISM (ACQUIRED): ICD-10-CM

## 2024-12-10 PROCEDURE — 1159F MED LIST DOCD IN RCRD: CPT | Performed by: OTOLARYNGOLOGY

## 2024-12-10 PROCEDURE — 1036F TOBACCO NON-USER: CPT | Performed by: OTOLARYNGOLOGY

## 2024-12-10 PROCEDURE — 99213 OFFICE O/P EST LOW 20 MIN: CPT | Performed by: OTOLARYNGOLOGY

## 2024-12-10 PROCEDURE — 1157F ADVNC CARE PLAN IN RCRD: CPT | Performed by: OTOLARYNGOLOGY

## 2024-12-10 PROCEDURE — 31575 DIAGNOSTIC LARYNGOSCOPY: CPT | Performed by: OTOLARYNGOLOGY

## 2024-12-10 PROCEDURE — 1160F RVW MEDS BY RX/DR IN RCRD: CPT | Performed by: OTOLARYNGOLOGY

## 2025-06-09 NOTE — PROGRESS NOTES
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 She does not have any evidence of any tumor recurrence.  A chest x-ray will be obtained today.     Dysphagia secondary to dryness and to malocclusion.  This seems to be stable.    Hypothyroidism which is well controlled with medication.  Her family physician just ordered the TSH level.     I will see her in 6 months.      Chief Complaint     Follow-up status post surgery for the management of an oral cavity cancer.      History of Present Illness    This lady was seen December 2021 at the request of a local colleague. She was first noticed to have a lesion in the back aspect of her buccal region on the right side back in March 2021. This led to a few procedures where some superficial lesion was removed. She has had ongoing issues and more recently biopsies were obtained and showed invasive squamous cell carcinoma. She had surgery on December 20, 2021. She did undergo a composite resection. She was presented at our tumor board and radiation therapy was recommended. She finished radiation at the end of March 2022. She has been able to swallow by mouth.  She does have some restriction in her diet. She is hypothyroid and on thyroid medication. She had a TSH level in April 2024 which was normal. She had a PET scan done in July 2022 that was negative.  She had a chest x-ray in August 2024 which was negative.  She does have some occasional discomfort around the right jaw.      Physical Exam    Examination of the oral cavity and oropharynx is negative for any worrisome mucosal lesions. There is good mobility of the tongue. She does have some trismus secondary to some scarring in the buccal area. She does have some malocclusion. She also has significant dryness. Palpation of the parotid, neck, and thyroid field fails to show any worrisome masses or adenopathies.      A flexible laryngoscopy was carried out. Under topical Xylocaine  and Luis M-Synephrine the scope was introduced through the nostril. The nasopharynx, base of tongue, hypopharynx, and larynx are visualized. The vocal cords are normally mobile. There is no pooling of secretions in the piriform sinuses. There is no evidence of any mucosal lesions.

## 2025-06-10 ENCOUNTER — HOSPITAL ENCOUNTER (OUTPATIENT)
Dept: RADIOLOGY | Facility: CLINIC | Age: 80
Discharge: HOME | End: 2025-06-10
Payer: MEDICARE

## 2025-06-10 ENCOUNTER — APPOINTMENT (OUTPATIENT)
Facility: CLINIC | Age: 80
End: 2025-06-10
Payer: MEDICARE

## 2025-06-10 VITALS — HEIGHT: 63 IN | BODY MASS INDEX: 34.38 KG/M2 | WEIGHT: 194 LBS

## 2025-06-10 DIAGNOSIS — E03.9 HYPOTHYROIDISM (ACQUIRED): ICD-10-CM

## 2025-06-10 DIAGNOSIS — C06.9 CANCER OF ORAL CAVITY (MULTI): ICD-10-CM

## 2025-06-10 DIAGNOSIS — R13.12 OROPHARYNGEAL DYSPHAGIA: ICD-10-CM

## 2025-06-10 DIAGNOSIS — C06.9 CANCER OF ORAL CAVITY (MULTI): Primary | ICD-10-CM

## 2025-06-10 PROCEDURE — 31575 DIAGNOSTIC LARYNGOSCOPY: CPT | Performed by: OTOLARYNGOLOGY

## 2025-06-10 PROCEDURE — 71046 X-RAY EXAM CHEST 2 VIEWS: CPT | Performed by: RADIOLOGY

## 2025-06-10 PROCEDURE — 71046 X-RAY EXAM CHEST 2 VIEWS: CPT

## 2025-06-10 PROCEDURE — 99213 OFFICE O/P EST LOW 20 MIN: CPT | Performed by: OTOLARYNGOLOGY

## 2025-06-10 PROCEDURE — 1159F MED LIST DOCD IN RCRD: CPT | Performed by: OTOLARYNGOLOGY

## 2025-06-10 PROCEDURE — 1036F TOBACCO NON-USER: CPT | Performed by: OTOLARYNGOLOGY

## 2025-06-10 PROCEDURE — 1157F ADVNC CARE PLAN IN RCRD: CPT | Performed by: OTOLARYNGOLOGY

## 2025-06-10 PROCEDURE — 1160F RVW MEDS BY RX/DR IN RCRD: CPT | Performed by: OTOLARYNGOLOGY

## 2025-12-09 ENCOUNTER — APPOINTMENT (OUTPATIENT)
Facility: CLINIC | Age: 80
End: 2025-12-09
Payer: MEDICARE

## (undated) DEVICE — BANDAGE, GAUZE, CONFORMING, KERLIX, 6 PLY, 4.5 IN X 4.1 YD

## (undated) DEVICE — REST, HEAD, BAGEL, 9 IN

## (undated) DEVICE — TOWEL, SURGICAL, NEURO, O/R, 16 X 26, BLUE, STERILE

## (undated) DEVICE — DRAPE, PAD, PREP, W/ 9 IN CUFF, 24 X 41, LF, NS

## (undated) DEVICE — ADHESIVE, SKIN, LIQUIBAND EXCEED

## (undated) DEVICE — TUBING, SUCTION, CONNECTING, NON-CONDUCTIVE, SURE GRIP CONNECTORS, 3/16 IN X 6 FT, PVC

## (undated) DEVICE — COVER, CART, 45 X 27 X 48 IN, CLEAR

## (undated) DEVICE — ACCESS SYS, KII OPTICAL, Z-THREAD, 11X100CM

## (undated) DEVICE — PUMP, STRYKERFLOW 2 & HANDPIECE W/10FT. IRRIGATION TUBING

## (undated) DEVICE — SHEARS, CURVED HARMONIC ACE 36CM

## (undated) DEVICE — Device

## (undated) DEVICE — MANIFOLD, 4 PORT NEPTUNE STANDARD

## (undated) DEVICE — DRAPE, LEGGINGS, 48 X 31 IN, STERILE, LF

## (undated) DEVICE — LIGASURE, SEALER/DIVIDER MARYLAND JAW, 5MM

## (undated) DEVICE — TROCAR, KII OPTICAL BLADELESS 5MM Z THREAD 100MM LNGTH

## (undated) DEVICE — SUTURE, PDS, 0, 18 IN, LIGATING LOOP, VIOLET

## (undated) DEVICE — GOWN, IMPERVIOUS, OPEN BACK, KNIT CUFFS, LARGE, BLUE

## (undated) DEVICE — SCOPE WARMER, LAPAROSCOPE, BAG ONLY, LF

## (undated) DEVICE — TUBE SET, PNEUMOCLEAR, SMOKE EVACU, HIGH-FLOW

## (undated) DEVICE — DRESSING, GAUZE, 16 PLY, 4 X 4 IN, STERILE

## (undated) DEVICE — VALVE, SUCTION, DEFENDO, DISPOSABLE, STRL

## (undated) DEVICE — TUBING, SUCTION, CONNECTING, STERILE 0.25 X 120 IN., LF

## (undated) DEVICE — PAD, GROUNDING, ELECTROSURGICAL, W/9 FT CABLE, POLYHESIVE II, ADULT, LF

## (undated) DEVICE — MOUTHPIECE, UNIVERSAL, DISPOSABLE

## (undated) DEVICE — DRAIN, PENROSE, 0.5 X 12 IN, LATEX, STERILE

## (undated) DEVICE — SUTURE, VICRYL, 4-0, 18 IN, UNDYED BR PS-2